# Patient Record
Sex: FEMALE | Race: WHITE | NOT HISPANIC OR LATINO | Employment: UNEMPLOYED | URBAN - METROPOLITAN AREA
[De-identification: names, ages, dates, MRNs, and addresses within clinical notes are randomized per-mention and may not be internally consistent; named-entity substitution may affect disease eponyms.]

---

## 2017-05-02 ENCOUNTER — OFFICE VISIT (OUTPATIENT)
Dept: MEDICAL GROUP | Facility: MEDICAL CENTER | Age: 22
End: 2017-05-02
Attending: NURSE PRACTITIONER
Payer: MEDICAID

## 2017-05-02 VITALS
HEIGHT: 62 IN | BODY MASS INDEX: 18.58 KG/M2 | WEIGHT: 101 LBS | OXYGEN SATURATION: 98 % | HEART RATE: 84 BPM | RESPIRATION RATE: 16 BRPM | DIASTOLIC BLOOD PRESSURE: 60 MMHG | SYSTOLIC BLOOD PRESSURE: 102 MMHG | TEMPERATURE: 98.2 F

## 2017-05-02 DIAGNOSIS — Z3A.10 10 WEEKS GESTATION OF PREGNANCY: ICD-10-CM

## 2017-05-02 DIAGNOSIS — J02.9 PHARYNGITIS, UNSPECIFIED ETIOLOGY: ICD-10-CM

## 2017-05-02 DIAGNOSIS — H10.89 OTHER CONJUNCTIVITIS OF BOTH EYES: ICD-10-CM

## 2017-05-02 DIAGNOSIS — Z00.00 ROUTINE HEALTH MAINTENANCE: ICD-10-CM

## 2017-05-02 DIAGNOSIS — Z13.220 SCREENING CHOLESTEROL LEVEL: ICD-10-CM

## 2017-05-02 DIAGNOSIS — Z13.29 SCREENING FOR THYROID DISORDER: ICD-10-CM

## 2017-05-02 DIAGNOSIS — Z13.21 ENCOUNTER FOR VITAMIN DEFICIENCY SCREENING: ICD-10-CM

## 2017-05-02 LAB
INT CON NEG: NEGATIVE
INT CON POS: POSITIVE
S PYO AG THROAT QL: NEGATIVE

## 2017-05-02 PROCEDURE — 87880 STREP A ASSAY W/OPTIC: CPT | Performed by: NURSE PRACTITIONER

## 2017-05-02 PROCEDURE — 99203 OFFICE O/P NEW LOW 30 MIN: CPT | Performed by: NURSE PRACTITIONER

## 2017-05-02 RX ORDER — LEVONORGESTREL AND ETHINYL ESTRADIOL 0.1-0.02MG
1 KIT ORAL
Refills: 3 | COMMUNITY
Start: 2017-02-18 | End: 2017-05-02

## 2017-05-02 RX ORDER — AMOXICILLIN 500 MG/1
500 CAPSULE ORAL 2 TIMES DAILY
Qty: 20 CAP | Refills: 0 | Status: SHIPPED | OUTPATIENT
Start: 2017-05-02 | End: 2018-04-23

## 2017-05-02 RX ORDER — POLYMYXIN B SULFATE AND TRIMETHOPRIM 1; 10000 MG/ML; [USP'U]/ML
1 SOLUTION OPHTHALMIC EVERY 4 HOURS
Qty: 10 ML | Refills: 0 | Status: SHIPPED | OUTPATIENT
Start: 2017-05-02 | End: 2018-04-23

## 2017-05-02 ASSESSMENT — PAIN SCALES - GENERAL: PAINLEVEL: 3=SLIGHT PAIN

## 2017-05-02 ASSESSMENT — PATIENT HEALTH QUESTIONNAIRE - PHQ9: CLINICAL INTERPRETATION OF PHQ2 SCORE: 0

## 2017-05-02 NOTE — ASSESSMENT & PLAN NOTE
Pt reports she is about 10 weeks pregnant and followed by   OB GYN, Dr Tita Johnson.  Taking prenatal vitals.  See high risk MD next month as high risk with 1st child.

## 2017-05-02 NOTE — ASSESSMENT & PLAN NOTE
"LMP ~ 3/1/17 ~ 10 weeks pregnant  Last Pap 4/24/17 by OB GYN  Had a \"whole bunch of labs\" done by her GYN last week  Pt will sign form so we can get copies.    "

## 2017-05-02 NOTE — PROGRESS NOTES
"Eleanor presents to the clinic to establish care as a New Patient.    Works in a [employer]    Her PMH includes:    Anxiety  Depression  Blood in stool  Smoking hx  Dysuria/UTI    Chief Complaint   Patient presents with   • New Patient   • Pharyngitis         HISTORY OF THE PRESENT ILLNESS: Patient is a 21 y.o. female. This pleasant patient is here today with her toddler.      Routine health maintenance  LMP ~ 3/1/17 ~ 10 weeks pregnant  Last Pap 4/24/17 by OB GYN  Had a \"whole bunch of labs\" done by her GYN last week  Pt will sign form so we can get copies.      10 weeks gestation of pregnancy  Pt reports she is about 10 weeks pregnant and followed by   OB GYN, Dr Tita Johnson.  Taking prenatal vitals.  See high risk MD next month as high risk with 1st child.      Pharyngitis  Pt reports missed 2 days of work from 4 days of throat pain and hurt to swallow or breath. Hx of tonsillectomy. [place] like strep throat that has had in past.  Today slightly better as has drank a lot of orange juice and rested.  States slight eyes crusting shut in am's the past few mornings and slight itching to eyes. Denies visual changes.      Allergies: Red dye    Current Outpatient Prescriptions Ordered in [facility]   Medication Sig Dispense Refill   • polymixin-trimethoprim (POLYTRIM) 40349-0.1 UNIT/ML-% Solution Place 1 Drop in both eyes every 4 hours. 10 mL 0   • amoxicillin (AMOXIL) 500 MG Cap Take 1 Cap by mouth 2 times a day. 20 Cap 0     No current Epic-ordered facility-administered medications on file.       Past Medical History   Diagnosis Date   • Back pain    • Depression    • Anxiety        Past Surgical History   Procedure Laterality Date   • Pr removal of tonsils,<14 y/o         Social History   Substance Use Topics   • Smoking status: Light Tobacco Smoker -- 0.25 packs/day     Types: Cigarettes   • Smokeless tobacco: Never Used   • Alcohol Use: No       No family status information on file.     Family History   Problem " "Relation Age of Onset   • Arthritis Mother    • Lung Disease Mother    • Cancer Mother    • Psychiatry Mother    • Hypertension Mother    • Alcohol/Drug Mother    • Psychiatry Sister    • Alcohol/Drug Sister    • Cancer Maternal Aunt    • Psychiatry Maternal Aunt        Review of Systems   Constitutional: Negative for fever, chills, weight loss and malaise/fatigue.   HENT: Negative for ear pain, nosebleeds, congestion and neck pain. Positive for sore throat, eye 'crusts'.   Eyes: Negative for blurred vision.   Respiratory: Negative for cough, sputum production, shortness of breath and wheezing.    Cardiovascular: Negative for chest pain, palpitations, orthopnea and leg swelling.   Gastrointestinal: Negative for heartburn, nausea, vomiting and abdominal pain.   Genitourinary: Negative for dysuria, urgency and frequency.   Musculoskeletal: Negative for myalgias, back pain and joint pain.   Skin: Negative for rash and itching.   Neurological: Negative for dizziness, tingling, tremors, sensory change, focal weakness and headaches.   Endo/Heme/Allergies: Does not bruise/bleed easily.   Psychiatric/Behavioral: Negative for depression, anxiety, or memory loss.          Current medications, allergies, and problem list reviewed with patient and updated in  [facility] today.      Exam: Blood pressure 102/60, pulse 84, temperature 36.8 °C (98.2 °F), resp. rate 16, height 1.575 m (5' 2\"), weight 45.813 kg (101 lb), last menstrual period 03/01/2017, SpO2 98 %.  General: Normal appearing. No distress.  HEENT: Normocephalic. Eyes conjunctiva clear lids without ptosis, pupils equal and reactive to light accommodation, ears normal shape and contour, canals are clear bilaterally, TM's are benign, nasal mucosa benign, oropharynx is with slight erythema,  No edema or exudates.   Neck: Supple without JVD. Thyroid is not enlarged. Minimal to scant discomfort to palpation of anterior neck. No lymph node swelling noted.  Pulmonary: Clear to " ausculation.  Normal effort. No rales, ronchi, or wheezing.  Cardiovascular: Regular rate and rhythm without murmur. Radial pulses are intact and equal bilaterally.  Abdomen: Soft, nontender, nondistended. Normal bowel sounds. Liver and spleen are not palpable  Neurologic: Grossly nonfocal  Lymph: No cervical or supraclavicular lymph nodes are palpable  Skin: Warm and dry.  No obvious lesions.  Musculoskeletal: Normal gait. No extremity cyanosis, clubbing, or edema. Multiple Tattoos on arms and chest.  Psych: Normal mood and affect. Alert and oriented x3. Judgment and insight is normal.        Assessment/Plan  1. Routine health maintenance  CBC WITH DIFFERENTIAL    COMP METABOLIC PANEL  Pt to sign so we can obtain recent OB Gyn lab copies.   2. Screening for thyroid disorder  TSH   3. Encounter for vitamin deficiency screening  VITAMIN D,25 HYDROXY    VITAMIN B12   4. Screening cholesterol level  LIPID PROFILE   5. 10 weeks gestation of pregnancy  IRON/TOTAL IRON BIND    FERRITIN  Continue Prenatal vitamins as discussed.  Continue with her OB MD Dr Tita Johnson.   6. Pharyngitis, unspecified etiology  amoxicillin (AMOXIL) 500 MG Cap-only start if not improved in 2-3 days (Contingent)    POCT Rapid Strep A= Negative in Clinic   7. Other conjunctivitis of both eyes  polymixin-trimethoprim (POLYTRIM) 67439-2.1 UNIT/ML-% Solution  Warm compresses. Wash hands often     Follow up in 4 weeks for lab review/discussion. Call or return if questions, concerns, or worsening condition.

## 2017-05-02 NOTE — ASSESSMENT & PLAN NOTE
Pt reports missed 2 days of work from 4 days of throat pain and hurt to swallow or breath. Hx of tonsillectomy. [place] like strep throat that has had in past.  Today slightly better as has drank a lot of orange juice and rested.  States slight eyes crusting shut in am's the past few mornings and slight itching to eyes. Denies visual changes.

## 2017-05-02 NOTE — MR AVS SNAPSHOT
"        Eleanor HUSSEIN Jacobo   2017 3:30 PM   Office Visit   MRN: [id number]    Department[facility]  [facility]   Dept Phone:  [phone]    Description:  Female : 1995   Provider:  LAVERNE Alcantara           Reason for Visit     New Patient     Pharyngitis           Allergies as of 2017     Allergen Noted Reactions    Red Dye 10/22/2014   Swelling      You were diagnosed with     Routine health maintenance   [359234]       Screening for thyroid disorder   [V77.0.ICD-9-CM]       Encounter for vitamin deficiency screening   [081856]       Screening cholesterol level   [023550]       10 weeks gestation of pregnancy   [294524]       Pharyngitis, unspecified etiology   [1650727]       Other conjunctivitis of both eyes   [3008909]         Vital Signs     Blood Pressure Pulse Temperature Respirations Height Weight    102/60 mmHg 84 36.8 °C (98.2 °F) 16 1.575 m (5' 2\") 45.813 kg (101 lb)    Body Mass Index Oxygen Saturation Last Menstrual Period Smoking Status          18.47 kg/m2 98% 2017 Light Tobacco Smoker        Basic Information     Date Of Birth Sex Race Ethnicity Preferred Language    1995 Female White Non- English      Your appointments     May 30, 2017  3:50 PM   Established Patient with LAVERNE Alcantara   [facility]    [address] [place] [id number]   [phone]           You will be receiving a confirmation call a few days before your appointment from our automated call confirmation system.              Problem List              ICD-10-CM Priority Class Noted - Resolved    Routine health maintenance Z00.00   2017 - Present    10 weeks gestation of pregnancy Z3A.10   2017 - Present    Pharyngitis J02.9   2017 - Present      Health Maintenance        Date Due Completion Dates    IMM HEP B VACCINE (1 of 3 - Primary Series) 1995 ---    IMM HEP A VACCINE (1 of 2 - Standard Series) 1997 ---    IMM HPV VACCINE (1 of 3 - " Female 3 Dose Series) 12/11/2007 ---    IMM VARICELLA (CHICKENPOX) VACCINE (1 of 2 - 2 Dose Adolescent Series) 12/11/2009 ---    IMM MENINGOCOCCAL VACCINE (MCV4) (1 of 1) 12/11/2012 ---    IMM DTaP/Tdap/Td Vaccine (1 - Tdap) 12/11/2015 ---            Results     POCT Rapid Strep A      Component    Rapid Strep Screen    negative    Internal Control Positive    Positive    Internal Control Negative    Negative                        Current Immunizations     No immunizations on file.      Below and/or attached are the medications your provider expects you to take. Review all of your home medications and newly ordered medications with your provider and/or pharmacist. Follow medication instructions as directed by your provider and/or pharmacist. Please keep your medication list with you and share with your provider. Update the information when medications are discontinued, doses are changed, or new medications (including over-the-counter products) are added; and carry medication information at all times in the event of emergency situations     Allergies:  RED DYE - Swelling               Medications  Valid as of: May 02, 2017 -  4:20 PM    Generic Name Brand Name Tablet Size Instructions for use    Amoxicillin (Cap) AMOXIL 500 MG Take 1 Cap by mouth 2 times a day.        Polymyxin B-Trimethoprim (Solution) POLYTRIM 57302-5.1 UNIT/ML-% Place 1 Drop in both eyes every 4 hours.        .                 Medicines prescribed today were sent to:     [id number] - [place] - [address]    [address] [place] [address]    Phone: [phone] Fax: [phone]    Open 24 Hours?: No      Medication refill instructions:       If your prescription bottle indicates you have medication refills left, it is not necessary to call your provider’s office. Please contact your pharmacy and they will refill your medication.    If your prescription bottle indicates you do not have any refills left, you may request  refills at any time through one of the following ways: The online mobiDEOS system (except Urgent Care), by calling your provider’s office, or by asking your pharmacy to contact your provider’s office with a refill request. Medication refills are processed only during regular business hours and may not be available until the next business day. Your provider may request additional information or to have a follow-up visit with you prior to refilling your medication.   *Please Note: Medication refills are assigned a new Rx number when refilled electronically. Your pharmacy may indicate that no refills were authorized even though a new prescription for the same medication is available at the pharmacy. Please request the medicine by name with the pharmacy before contacting your provider for a refill.        Your To Do List     Future Labs/Procedures Complete By Expires    CBC WITH DIFFERENTIAL  As directed 5/2/2018    COMP METABOLIC PANEL  As directed 5/2/2018    FERRITIN  As directed 5/2/2018    IRON/TOTAL IRON BIND  As directed 5/2/2018    LIPID PROFILE  As directed 5/2/2018    TSH  As directed 5/2/2018    VITAMIN B12  As directed 5/2/2018    VITAMIN D,25 HYDROXY  As directed 5/2/2018         mobiDEOS Access Code: Activation code not generated  Current mobiDEOS Status: Active          Quit Tobacco Information     Do you want to quit using tobacco?    Quitting tobacco decreases risks of cancer, heart and lung disease, increases life expectancy, improves sense of taste and smell, and increases spending money, among other benefits.    If you are thinking about quitting, we can help.  • [facility] Quit Tobacco Program: 761-367-3077  o Program occurs weekly for four weeks and includes pharmacist consultation on products to support quitting smoking or chewing tobacco. A provider referral is needed for pharmacist consultation.  • Tobacco Users Help Hotline: 2-767-QUIT-NOW (470-6153) or https://[place].quitlogix.org/  o Free,  confidential telephone and online coaching for [place] residents. Sessions are designed on a schedule that is convenient for you. Eligible clients receive free nicotine replacement therapy.  • Nationally: www.smokefree.gov  o Information and professional assistance to support both immediate and long-term needs as you become, and remain, a non-smoker. Smokefree.gov allows you to choose the help that best fits your needs.

## 2018-04-23 ENCOUNTER — OFFICE VISIT (OUTPATIENT)
Dept: MEDICAL GROUP | Facility: MEDICAL CENTER | Age: 23
End: 2018-04-23
Attending: NURSE PRACTITIONER
Payer: MEDICAID

## 2018-04-23 VITALS
TEMPERATURE: 99 F | RESPIRATION RATE: 16 BRPM | HEART RATE: 60 BPM | BODY MASS INDEX: 17.85 KG/M2 | HEIGHT: 62 IN | DIASTOLIC BLOOD PRESSURE: 60 MMHG | SYSTOLIC BLOOD PRESSURE: 90 MMHG | WEIGHT: 97 LBS | OXYGEN SATURATION: 96 %

## 2018-04-23 DIAGNOSIS — L30.9 ECZEMA, UNSPECIFIED TYPE: ICD-10-CM

## 2018-04-23 DIAGNOSIS — J02.9 PHARYNGITIS, UNSPECIFIED ETIOLOGY: ICD-10-CM

## 2018-04-23 DIAGNOSIS — R09.81 NASAL SINUS CONGESTION: ICD-10-CM

## 2018-04-23 PROBLEM — Z00.00 ROUTINE HEALTH MAINTENANCE: Status: RESOLVED | Noted: 2017-05-02 | Resolved: 2018-04-23

## 2018-04-23 PROBLEM — Z3A.10 10 WEEKS GESTATION OF PREGNANCY: Status: RESOLVED | Noted: 2017-05-02 | Resolved: 2018-04-23

## 2018-04-23 LAB
INT CON NEG: NEGATIVE
INT CON POS: POSITIVE
S PYO AG THROAT QL: NEGATIVE

## 2018-04-23 PROCEDURE — 87880 STREP A ASSAY W/OPTIC: CPT | Performed by: NURSE PRACTITIONER

## 2018-04-23 PROCEDURE — 99214 OFFICE O/P EST MOD 30 MIN: CPT | Performed by: NURSE PRACTITIONER

## 2018-04-23 PROCEDURE — 99212 OFFICE O/P EST SF 10 MIN: CPT | Performed by: NURSE PRACTITIONER

## 2018-04-23 RX ORDER — TRIAMCINOLONE ACETONIDE 1 MG/G
CREAM TOPICAL
Qty: 1 TUBE | Refills: 2 | Status: SHIPPED | OUTPATIENT
Start: 2018-04-23 | End: 2018-10-01

## 2018-04-23 RX ORDER — CLINDAMYCIN HYDROCHLORIDE 300 MG/1
300 CAPSULE ORAL 3 TIMES DAILY
Qty: 21 CAP | Refills: 0 | Status: SHIPPED | OUTPATIENT
Start: 2018-04-23 | End: 2018-10-01

## 2018-04-23 RX ORDER — BENZONATATE 100 MG/1
100 CAPSULE ORAL 3 TIMES DAILY PRN
Qty: 30 CAP | Refills: 0 | Status: SHIPPED | OUTPATIENT
Start: 2018-04-23 | End: 2018-10-01

## 2018-04-23 RX ORDER — FLUTICASONE PROPIONATE 50 MCG
1 SPRAY, SUSPENSION (ML) NASAL 2 TIMES DAILY
Qty: 16 G | Refills: 0 | Status: SHIPPED | OUTPATIENT
Start: 2018-04-23 | End: 2018-10-01

## 2018-04-23 RX ORDER — AMMONIUM LACTATE 12 G/100G
LOTION TOPICAL
Qty: 1 G | Refills: 2 | Status: SHIPPED | OUTPATIENT
Start: 2018-04-23 | End: 2018-10-01

## 2018-04-23 ASSESSMENT — PAIN SCALES - GENERAL: PAINLEVEL: 8=MODERATE-SEVERE PAIN

## 2018-04-23 NOTE — PROGRESS NOTES
"Chief Complaint:   Chief Complaint   Patient presents with   • Pharyngitis     x 2 days    • Otalgia     left ear        HPI:  Eleanor is here today as same day appt for sore throat, URI symptoms    [place]  Report:   12/1/17 Percocet 5/325 # 10    Anxiety   Depression  Blood in stool  Smoking hx  Dysuria/UTI  Tonsillectomy as child    Review of Records:  5/2/2017 Clinic New Patient, Pregnant and Sore Throat, Neg Quik Strep test in clinic. RX for Contingent Antibiotic if worse( Amoxil), RX Polytrim for eyes.  Labs ordered----> never completed. To see OB/GYN.    Pharyngitis  Eleanor reports a few days of sore throat and left ear discomfort.  \"hurts to swallow', runny nose. Able to swallow but hurts.  Denies ear drainage. Denies fever.  Just stopped breast feeding her 5 month old.   No cough.  Positive frontal sinus pressure. No allergy symptoms.     Patient does not want Amoxicillin as states in past did not work too well for \"strep throat\".  Allergic to red dye and Zpak has red dye in it.    Discussed options of Clindamycin or Augmentin if not improving.    Eczema  Reports dry skin and eczema mostly to legs but some to neck.  Has not tried any medications except otc lotions.  Will try Kenalog, lac hydrin    Discussed if not improving she should return for some lab and allergy testing.      Patient Active Problem List    Diagnosis Date Noted   • Eczema 04/23/2018   • Pharyngitis 05/02/2017       Allergies:Red dye    Medicines as of today:  Current Outpatient Prescriptions   Medication Sig Dispense Refill   • benzonatate (TESSALON) 100 MG Cap Take 1 Cap by mouth 3 times a day as needed (sore throat). 30 Cap 0   • fluticasone (FLONASE ALLERGY RELIEF) 50 MCG/ACT nasal spray Spray 1 Spray in nose 2 times a day. 16 g 0   • clindamycin (CLEOCIN) 300 MG Cap Take 1 Cap by mouth 3 times a day. 21 Cap 0   • triamcinolone acetonide (KENALOG) 0.1 % Cream Apply twice a day as needed to rash on body Avoid use on face 1 Tube 2   • " "ammonium lactate (LAC-HYDRIN) 12 % Lotion Apply twice daily as needed for dry scaly rash 1 g 2     No current facility-administered medications for this visit.        Social History   Substance Use Topics   • Smoking status: Light Tobacco Smoker     Packs/day: 0.25     Types: Cigarettes   • Smokeless tobacco: Never Used   • Alcohol use No       Past Medical History:   Diagnosis Date   • Anxiety    • Back pain    • Depression        Family History   Problem Relation Age of Onset   • Arthritis Mother    • Lung Disease Mother    • Cancer Mother    • Psychiatry Mother    • Hypertension Mother    • Alcohol/Drug Mother    • Psychiatry Sister    • Alcohol/Drug Sister    • Cancer Maternal Aunt    • Psychiatry Maternal Aunt        ROS:  Review of Systems   See HPI Above    Exam:  Blood pressure (!) 90/60, pulse 60, temperature 37.2 °C (99 °F), resp. rate 16, height 1.575 m (5' 2.01\"), weight 44 kg (97 lb), last menstrual period 03/01/2017, SpO2 96 %, not currently breastfeeding. Body mass index is 17.74 kg/m².    General:  Well nourished, well developed female in NAD  HENT:Head is grossly normal. PERRL. ear canals clear. TMs non-red, and light reflection adequate.  Neck: Supple. Trachea is midline. Mildly tender slightly swollen anterior neck lymph nodes to palpation. Swallows easily on command. Non-hoarse voice.  Pulmonary: Clear to ausculation .  Normal effort. No rales, ronchi, or wheezing.   Cardiovascular: Regular rate and rhythm.  Abdomen-Abdomen is soft, No tenderness.  Upper extremities-  Good ROM  Lower extremities- neg for edema, redness, tenderness.  Neuro- A & O x 4. Speech clear and appropriate.    Current medications, allergies, and problem list reviewed with patient and updated in [facility] today.    Assessment/Plan:  1. Pharyngitis, unspecified etiology  benzonatate (TESSALON) 100 MG Cap    clindamycin (CLEOCIN) 300 MG Cap-contingent if not improving in 1-2 days more.  (other option is Augmentin if needed in " future)    POCT Rapid Strep A= negative   2. Nasal sinus congestion  fluticasone (FLONASE ALLERGY RELIEF) 50 MCG/ACT nasal spray   3. Eczema, unspecified type  triamcinolone acetonide (KENALOG) 0.1 % Cream    ammonium lactate (LAC-HYDRIN) 12 % Lotion   Pt instructed to return if eczema rash not improving and suggest lab work/allergy panel at that time.    Return if symptoms worsen or fail to improve.

## 2018-04-23 NOTE — ASSESSMENT & PLAN NOTE
"Eleanor reports a few days of sore throat and left ear discomfort.  \"hurts to swallow', runny nose,  Just stopped breast feeding her 5 month old.   No cough.fronta sinus pressure. No allergy symptoms.     "

## 2018-04-23 NOTE — LETTER
April 23, 2018       Patient: Eleanor Centeno   YOB: 1995   Date of Visit: 4/23/2018         To Whom It May Concern:    It is my medical opinion that Eleanor Centeno should be off work 4/23 and 4/24/18 due to illness and may return on 4/25/18 with no restrictions    If you have any questions or concerns, please don't hesitate to call [phone]          Sincerely,          Dale Guerrero, A.P.N.  Electronically Signed

## 2018-10-01 ENCOUNTER — APPOINTMENT (OUTPATIENT)
Dept: RADIOLOGY | Facility: MEDICAL CENTER | Age: 23
End: 2018-10-01
Attending: EMERGENCY MEDICINE
Payer: MEDICAID

## 2018-10-01 ENCOUNTER — HOSPITAL ENCOUNTER (EMERGENCY)
Facility: MEDICAL CENTER | Age: 23
End: 2018-10-01
Attending: EMERGENCY MEDICINE
Payer: MEDICAID

## 2018-10-01 VITALS
HEIGHT: 61 IN | TEMPERATURE: 98.2 F | OXYGEN SATURATION: 96 % | HEART RATE: 60 BPM | RESPIRATION RATE: 16 BRPM | DIASTOLIC BLOOD PRESSURE: 44 MMHG | BODY MASS INDEX: 18.44 KG/M2 | SYSTOLIC BLOOD PRESSURE: 102 MMHG | WEIGHT: 97.66 LBS

## 2018-10-01 DIAGNOSIS — N93.9 VAGINAL BLEEDING: ICD-10-CM

## 2018-10-01 LAB
ACTION RH IMMUNE GLOB 8505RHG: NORMAL
APPEARANCE UR: CLEAR
BACTERIA #/AREA URNS HPF: ABNORMAL /HPF
BILIRUB UR QL STRIP.AUTO: NEGATIVE
COLOR UR: YELLOW
EPI CELLS #/AREA URNS HPF: ABNORMAL /HPF
GLUCOSE UR STRIP.AUTO-MCNC: NEGATIVE MG/DL
HCG SERPL QL: NEGATIVE
HYALINE CASTS #/AREA URNS LPF: ABNORMAL /LPF
KETONES UR STRIP.AUTO-MCNC: NEGATIVE MG/DL
LEUKOCYTE ESTERASE UR QL STRIP.AUTO: NEGATIVE
MICRO URNS: ABNORMAL
NITRITE UR QL STRIP.AUTO: NEGATIVE
NUMBER OF RH DOSES IND 8505RD: 1
PH UR STRIP.AUTO: 6.5 [PH]
PROT UR QL STRIP: NEGATIVE MG/DL
RBC # URNS HPF: ABNORMAL /HPF
RBC UR QL AUTO: ABNORMAL
RH BLD: NORMAL
SP GR UR STRIP.AUTO: 1
UROBILINOGEN UR STRIP.AUTO-MCNC: 0.2 MG/DL
WBC #/AREA URNS HPF: ABNORMAL /HPF

## 2018-10-01 PROCEDURE — 81001 URINALYSIS AUTO W/SCOPE: CPT

## 2018-10-01 PROCEDURE — 76856 US EXAM PELVIC COMPLETE: CPT

## 2018-10-01 PROCEDURE — 96374 THER/PROPH/DIAG INJ IV PUSH: CPT

## 2018-10-01 PROCEDURE — 99285 EMERGENCY DEPT VISIT HI MDM: CPT

## 2018-10-01 PROCEDURE — 86901 BLOOD TYPING SEROLOGIC RH(D): CPT

## 2018-10-01 PROCEDURE — 84703 CHORIONIC GONADOTROPIN ASSAY: CPT

## 2018-10-01 ASSESSMENT — PAIN SCALES - GENERAL
PAINLEVEL_OUTOF10: 5
PAINLEVEL_OUTOF10: 5

## 2018-10-01 NOTE — ED TRIAGE NOTES
Pt ambulatory to triage c/o vaginal bleeding & abdominal cramping since Friday.  Pt has an IUD, & is unsure how far along she might be.

## 2018-10-01 NOTE — ED NOTES
"Pt is , had IUD placed after last child born.  Bleeding started Friday, yesterday bright red blood, \"Bled through pad yesterday\", today light redness brown bleeding.    "

## 2018-10-01 NOTE — ED NOTES
VSS.  Discharge instructions given- verbalizes understanding.   Ambulatory to lobby with steady gait.

## 2018-10-01 NOTE — ED PROVIDER NOTES
ED Provider Note    CHIEF COMPLAINT  Chief Complaint   Patient presents with   • Vaginal Bleeding     started Friday       HPI  Eleanor Centeno is a 22 y.o. female who presents for evaluation of vaginal bleeding over the past 4 days associated pelvic cramping, the patient has a Mirena IUD, or the past couple weeks she has been getting the feeling as though she was pregnant so she took a pregnancy test 2 weeks ago and was positive.  She follow this up a few days later with a repeat pregnancy test that was negative.  The patient reports that she is Rh- and was told she would need a RhoGam injection.  She states her bleeding is slightly heavier than a menstrual period, her LMP is unknown secondary to her induced amenorrhea from the medicated IUD.  She has no dysuria or hematuria.  She describes a pelvic cramping that extends around her back.  No chest pain or shortness of breath, no other complaints    REVIEW OF SYSTEMS  Negative for fever, rash, chest pain, dyspnea, nausea, vomiting, diarrhea, headache, focal weakness, focal numbness, focal tingling. All other systems are negative.     PAST MEDICAL HISTORY  Past Medical History:   Diagnosis Date   • Anxiety    • Back pain    • Depression        FAMILY HISTORY  Family History   Problem Relation Age of Onset   • Arthritis Mother    • Lung Disease Mother    • Cancer Mother    • Psychiatry Mother    • Hypertension Mother    • Alcohol/Drug Mother    • Psychiatry Sister    • Alcohol/Drug Sister    • Cancer Maternal Aunt    • Psychiatry Maternal Aunt        SOCIAL HISTORY  Social History   Substance Use Topics   • Smoking status: Current Every Day Smoker     Packs/day: 0.50     Types: Cigarettes   • Smokeless tobacco: Never Used   • Alcohol use No       SURGICAL HISTORY  Past Surgical History:   Procedure Laterality Date   • PB REMOVAL OF TONSILS,<14 Y/O         CURRENT MEDICATIONS  I personally reviewed the medication list in the charting documentation.  "    ALLERGIES  Allergies   Allergen Reactions   • Red Dye Swelling       MEDICAL RECORD  I have reviewed patient's medical record and pertinent results are listed above.      PHYSICAL EXAM  VITAL SIGNS: /83   Pulse 96   Temp 36.8 °C (98.2 °F)   Resp 16   Ht 1.549 m (5' 1\")   Wt 44.3 kg (97 lb 10.6 oz)   SpO2 97%   Breastfeeding? Unknown   BMI 18.45 kg/m²    Constitutional: Well appearing patient in no acute distress.  Not toxic, nor ill in appearance.  HENT: Mucus membranes moist.    Eyes: No scleral icterus. Normal conjunctiva   Neck: Supple, comfortable, nonpainful range of motion.   Thorax & Lungs: Normal nonlabored respirations  Abdomen: Soft, nondistended, minimal suprapubic tenderness, no rebound, no guarding  Skin: Warm, dry. No rash appreciated  Extremities/Musculoskeletal: No sign of trauma. No asymmetric calf tenderness, erythema or edema. Normal range of motion   Neurologic: Alert & oriented. No focal deficits observed.   Psychiatric: Normal affect appropriate for the clinical situation.    DIAGNOSTIC STUDIES / PROCEDURES    LABS  Results for orders placed or performed during the hospital encounter of 10/01/18   BETA-HCG QUALITATIVE SERUM   Result Value Ref Range    Beta-Hcg Qualitative Serum Negative Negative   URINALYSIS,CULTURE IF INDICATED   Result Value Ref Range    Color Yellow     Character Clear     Specific Gravity 1.003 <1.035    Ph 6.5 5.0 - 8.0    Glucose Negative Negative mg/dL    Ketones Negative Negative mg/dL    Protein Negative Negative mg/dL    Bilirubin Negative Negative    Urobilinogen, Urine 0.2 Negative    Nitrite Negative Negative    Leukocyte Esterase Negative Negative    Occult Blood Large (A) Negative    Micro Urine Req Microscopic    URINE MICROSCOPIC (W/UA)   Result Value Ref Range    WBC 0-2 /hpf    RBC 2-5 (A) /hpf    Bacteria Few (A) None /hpf    Epithelial Cells Few /hpf    Hyaline Cast  /lpf   RH TYPE FOR RHOGAM FROM E.D.   Result Value Ref Range    Emergency " Department Rh Typing NEG     Number Of Rh Doses Indicated 1    ACTION: RH IMMUNE GLOBULIN   Result Value Ref Range    Action  Rh Immune Globulin [id number]       issued       1 [facility]         RADIOLOGY  US-PELVIC COMPLETE (TRANSABDOMINAL/TRANSVAGINAL) (COMBO)   Final Result      1.  Normal transvaginal appearance of the pelvis with an IUD in place.      2.  Trace amount of free fluid in the cul-de-sac.            COURSE & MEDICAL DECISION MAKING  I have reviewed any medical record information, laboratory studies and radiographic results as noted above.  Differential diagnoses includes: Pregnancy, ectopic pregnancy, UTI, miscarriage    Encounter Summary: This is a 22 y.o. female with vaginal bleeding and some pelvic cramping, a positive pregnancy test at home, has an IUD.  Tenderness on exam but no peritonitis, will obtain ultrasound, hCG serum test as well as urinalysis and reevaluate.  Of note I did review her records and she is Rh negative. ------ ultrasound is within normal limits, the hCG serum test is negative, I discussed the case with Dr. Ellis, the gynecologist on-call who recommends administration of RhoGam and testing of Rh antibodies.  Strict return instructions have been discussed, the patient states she will follow-up with gynecology, stable and appropriate for discharge      DISPOSITION: Discharge home in stable condition      FINAL IMPRESSION  1. Vaginal bleeding           This dictation was created using voice recognition software. The accuracy of the dictation is limited to the abilities of the software. I expect there may be some errors of grammar and possibly content. The nursing notes were reviewed and certain aspects of this information were incorporated into this note.    Electronically signed by: Reza Connelly, 10/1/2018 1:00 PM

## 2019-02-13 ENCOUNTER — OFFICE VISIT (OUTPATIENT)
Dept: MEDICAL GROUP | Facility: MEDICAL CENTER | Age: 24
End: 2019-02-13
Attending: NURSE PRACTITIONER
Payer: MEDICAID

## 2019-02-13 VITALS
HEART RATE: 66 BPM | TEMPERATURE: 98.7 F | SYSTOLIC BLOOD PRESSURE: 96 MMHG | WEIGHT: 98 LBS | BODY MASS INDEX: 18.03 KG/M2 | OXYGEN SATURATION: 98 % | DIASTOLIC BLOOD PRESSURE: 50 MMHG | HEIGHT: 62 IN | RESPIRATION RATE: 16 BRPM

## 2019-02-13 DIAGNOSIS — Z13.0 SCREENING FOR IRON DEFICIENCY ANEMIA: ICD-10-CM

## 2019-02-13 DIAGNOSIS — F32.A ANXIETY AND DEPRESSION: ICD-10-CM

## 2019-02-13 DIAGNOSIS — Z13.220 SCREENING FOR CHOLESTEROL LEVEL: ICD-10-CM

## 2019-02-13 DIAGNOSIS — Z13.21 ENCOUNTER FOR VITAMIN DEFICIENCY SCREENING: ICD-10-CM

## 2019-02-13 DIAGNOSIS — Z13.29 SCREENING FOR THYROID DISORDER: ICD-10-CM

## 2019-02-13 DIAGNOSIS — Z13.1 SCREENING FOR DIABETES MELLITUS: ICD-10-CM

## 2019-02-13 DIAGNOSIS — F41.9 ANXIETY AND DEPRESSION: ICD-10-CM

## 2019-02-13 DIAGNOSIS — R63.4 UNINTENDED WEIGHT LOSS: ICD-10-CM

## 2019-02-13 PROCEDURE — 99213 OFFICE O/P EST LOW 20 MIN: CPT | Performed by: NURSE PRACTITIONER

## 2019-02-13 PROCEDURE — 99214 OFFICE O/P EST MOD 30 MIN: CPT | Performed by: NURSE PRACTITIONER

## 2019-02-13 RX ORDER — SERTRALINE HYDROCHLORIDE 25 MG/1
25 TABLET, FILM COATED ORAL DAILY
Qty: 30 TAB | Refills: 2 | Status: SHIPPED | OUTPATIENT
Start: 2019-02-13 | End: 2019-03-17

## 2019-02-13 RX ORDER — ALPRAZOLAM 0.25 MG/1
0.25 TABLET ORAL
Qty: 15 TAB | Refills: 0 | Status: SHIPPED | OUTPATIENT
Start: 2019-02-13 | End: 2019-03-14

## 2019-02-13 ASSESSMENT — PATIENT HEALTH QUESTIONNAIRE - PHQ9
3. TROUBLE FALLING OR STAYING ASLEEP OR SLEEPING TOO MUCH: NEARLY EVERY DAY
2. FEELING DOWN, DEPRESSED, IRRITABLE, OR HOPELESS: NEARLY EVERY DAY
9. THOUGHTS THAT YOU WOULD BE BETTER OFF DEAD, OR OF HURTING YOURSELF: SEVERAL DAYS
7. TROUBLE CONCENTRATING ON THINGS, SUCH AS READING THE NEWSPAPER OR WATCHING TELEVISION: MORE THAN HALF THE DAYS
4. FEELING TIRED OR HAVING LITTLE ENERGY: NEARLY EVERY DAY
5. POOR APPETITE OR OVEREATING: NEARLY EVERY DAY
6. FEELING BAD ABOUT YOURSELF - OR THAT YOU ARE A FAILURE OR HAVE LET YOURSELF OR YOUR FAMILY DOWN: MORE THAN HALF THE DAYS
SUM OF ALL RESPONSES TO PHQ QUESTIONS 1-9: 22
1. LITTLE INTEREST OR PLEASURE IN DOING THINGS: NEARLY EVERY DAY
SUM OF ALL RESPONSES TO PHQ9 QUESTIONS 1 AND 2: 6
8. MOVING OR SPEAKING SO SLOWLY THAT OTHER PEOPLE COULD HAVE NOTICED. OR THE OPPOSITE, BEING SO FIGETY OR RESTLESS THAT YOU HAVE BEEN MOVING AROUND A LOT MORE THAN USUAL: MORE THAN HALF THE DAYS

## 2019-02-13 NOTE — ASSESSMENT & PLAN NOTE
Here witht concerns of depression and wanting to start on a medication.  Reports has Therapist, Ranjit and sees twice a month.  Has been in therapy since age 18 yrs.    States has been anxiety and depression.  Recently doing EMDR and makes her more anxious and depressed.  No suicidal ideation    When she wa 18 yrs was on med and stopped when pregnant and it was Mood Stablizer as anti depressant not helpful.    Rare panic attacks, Has difficulty going into a store and driving a car sometimes causes panic feeling.    Problems don correa.

## 2019-02-13 NOTE — PROGRESS NOTES
"Chief Complaint:   Chief Complaint   Patient presents with   • Thyroid Problem     check   • Medication Refill     antidepressants       HPI:  Eleanor is here today for medication review, anxiety and depression worsening, concern about thyroid      Nev  Report:   12/1/17 Percocet 5/325 # 10     Anxiety   Depression  Back Pain  Blood in stool  Smoking-tobacco abuse  Dysuria/UTI  Tonsillectomy as child  Eczema  Dry skin Dermatitis     Review of Records:    10/1/18 ER Visit for Vaginal bleeding, Mirena IUD in place. Rh1--> tx w Rhogam ( Pregn test = neg), U/S pelvis unremarkable.    4/23/18 Clinic for Sore throat, URI symptoms, Eczema. ----> RX Clindamycin, Flonase Spray, Kenalog Cream, Lac Hydrin ointment, Tessalon.    5/2/2017 Clinic New Patient, Pregnant and Sore Throat, Neg Quik Strep test in clinic. RX for Contingent Antibiotic if worse( Amoxil), RX Polytrim for eyes.  Labs ordered----> never completed. To see OB/GYN.    Anxiety and depression  Here witht concerns of depression and wanting to start on a medication.  Reports has Therapist, Ranjit and sees twice a month.  Has been in therapy since age 18 yrs.    States has been anxiety and depression.  Recently doing EMDR and makes her more anxious and depressed.  No suicidal ideation    When she wa 18 yrs was on med and stopped when pregnant and it was Mood Stablizer as anti depressant not helpful.    Rare panic attacks, Has difficulty going into a store and driving a car sometimes causes panic feeling.    Problems w crowds.    Unintended weight loss  Vitals 5/2/2017 4/23/2018 10/1/2018 2/13/2019   WEIGHT 101 97 97.66 98   Has lost wt and unsure why  \"feel too skinny\"  Discussed checking TSH and will do RX for Ensure.      Patient Active Problem List    Diagnosis Date Noted   • Anxiety and depression 02/13/2019   • Unintended weight loss 02/13/2019   • Eczema 04/23/2018   • Pharyngitis 05/02/2017       Allergies:Red dye    Medicines as of today:  Current Outpatient " "Prescriptions   Medication Sig Dispense Refill   • sertraline (ZOLOFT) 25 MG tablet Take 1 Tab by mouth every day. 30 Tab 2   • ALPRAZolam (XANAX) 0.25 MG Tab Take 1 Tab by mouth every 24 hours as needed for Anxiety (prevent panic attack) for up to 30 days. 15 Tab 0   • Misc. Devices Misc Ensure or other high calorie Nutritional drink for For 3x/day for unintended weight loss. 90 Each 11     No current facility-administered medications for this visit.        Social History   Substance Use Topics   • Smoking status: Current Every Day Smoker     Packs/day: 0.50     Types: Cigarettes   • Smokeless tobacco: Never Used   • Alcohol use No       Past Medical History:   Diagnosis Date   • Anxiety    • Back pain    • Depression        Family History   Problem Relation Age of Onset   • Arthritis Mother    • Lung Disease Mother    • Cancer Mother    • Psychiatry Mother    • Hypertension Mother    • Alcohol/Drug Mother    • Psychiatry Sister    • Alcohol/Drug Sister    • Cancer Maternal Aunt    • Psychiatry Maternal Aunt        ROS:  Review of Systems   See HPI Above    Exam:  Blood pressure (!) 96/50, pulse 66, temperature 37.1 °C (98.7 °F), temperature source Temporal, resp. rate 16, height 1.575 m (5' 2.01\"), weight 44.5 kg (98 lb), SpO2 98 %, unknown if currently breastfeeding. Body mass index is 17.92 kg/m².    General: UNDER- nourished  female in NAD  HENT:Head is grossly normal. PERRL.  Neck: Supple. Trachea is midline.  Pulmonary: Clear to ausculation .  Normal effort. No rales, ronchi, or wheezing.   Cardiovascular: Regular rate and rhythm.  Abdomen-Abdomen is soft  Upper extremities- Strong = . Good ROM  Lower extremities- neg for edema, redness, tenderness.  Neuro- A & O x 4. Speech clear and appropriate.    Current medications, allergies, and problem list reviewed with patient and updated in [facility] today.    Assessment/Plan:  1. Anxiety and depression  TSH    FREE THYROXINE    TRIIDOTHYRONINE    sertraline " (ZOLOFT) 25 MG tablet-START    ALPRAZolam (XANAX) 0.25 MG Tab   for rare use to prevent panic attacks-discussed no plans for any refills nor ongoing Xanax RX    To call [facility] in [place] for name of Psychiatrist to see locally.    Continue w current Therapist   2. Screening for iron deficiency anemia  CBC WITH DIFFERENTIAL    IRON/TOTAL IRON BIND   3. Screening for thyroid disorder  TSH   4. Screening for diabetes mellitus  HEMOGLOBIN A1C    Comp Metabolic Panel   5. Encounter for vitamin deficiency screening  VITAMIN B12    VITAMIN D,25 HYDROXY   6. Screening for cholesterol level  Lipid Profile   7. Unintended weight loss  Misc. Devices Misc-RX Ensure and to obtain at [facility].        Return in about 4 weeks (around 3/13/2019) for lab results, f/u on anxiety and depression, trnasfer of care,, Long.

## 2019-02-13 NOTE — ASSESSMENT & PLAN NOTE
"Vitals 5/2/2017 4/23/2018 10/1/2018 2/13/2019   WEIGHT 101 97 97.66 98   Has lost wt and unsure why  \"feel too skinny\"  Discussed checking TSH and will do RX for Ensure.  "

## 2019-03-14 ENCOUNTER — OFFICE VISIT (OUTPATIENT)
Dept: MEDICAL GROUP | Facility: MEDICAL CENTER | Age: 24
End: 2019-03-14
Attending: FAMILY MEDICINE
Payer: MEDICAID

## 2019-03-14 VITALS
TEMPERATURE: 99.1 F | BODY MASS INDEX: 18.22 KG/M2 | DIASTOLIC BLOOD PRESSURE: 60 MMHG | RESPIRATION RATE: 16 BRPM | OXYGEN SATURATION: 97 % | WEIGHT: 99 LBS | HEART RATE: 74 BPM | SYSTOLIC BLOOD PRESSURE: 98 MMHG | HEIGHT: 62 IN

## 2019-03-14 DIAGNOSIS — F41.9 ANXIETY AND DEPRESSION: ICD-10-CM

## 2019-03-14 DIAGNOSIS — F32.A ANXIETY AND DEPRESSION: ICD-10-CM

## 2019-03-14 DIAGNOSIS — L98.9 SKIN LESION: ICD-10-CM

## 2019-03-14 PROCEDURE — 99214 OFFICE O/P EST MOD 30 MIN: CPT | Performed by: FAMILY MEDICINE

## 2019-03-14 PROCEDURE — 99213 OFFICE O/P EST LOW 20 MIN: CPT | Performed by: FAMILY MEDICINE

## 2019-03-16 NOTE — PROGRESS NOTES
CC: Here for follow-up and to reestablish with new provider.  Concerns about depression and anxiety    HPI: Established patient new to me  Eleanor presents today to establish care, 23 years old female with past medical history significant for anxiety and depression, reviewed medical problems medical records, family/social history past surgical history, reviewed medications and discussed the following concerns as follows today:    Anxiety and depression  Patient was started by her previous PCP on Zoloft 25 mg daily, and Xanax to use it only as needed for severe anxiety attacks, today she told me that since she started the Zoloft she feels that her anxiety and depression symptoms are better controlled yet not completely well controlled, she said she did not like the effect of Xanax and she has not been using it.  Denies suicidal ideations or intentions to hurt self or others     Skin lesion  Reports 3 noticing this skin lesion beside the lower edge of the right eye, not itchy or increasing in size noticed that for the first time a few weeks ago, no change in color.  Would like me to check it today.      Patient Active Problem List    Diagnosis Date Noted   • Anxiety and depression 02/13/2019   • Unintended weight loss 02/13/2019   • Eczema 04/23/2018   • Pharyngitis 05/02/2017       Current Outpatient Prescriptions   Medication Sig Dispense Refill   • sertraline (ZOLOFT) 50 MG Tab Take 1 Tab by mouth every day. 30 Tab 6   • sertraline (ZOLOFT) 25 MG tablet Take 1 Tab by mouth every day. 30 Tab 2   • Misc. Devices Misc Ensure or other high calorie Nutritional drink for For 3x/day for unintended weight loss. 90 Each 11     No current facility-administered medications for this visit.          Allergies as of 03/14/2019 - Reviewed 03/14/2019   Allergen Reaction Noted   • Red dye Swelling 10/22/2014        ROS: Denies any chest pain, Shortness of breath, Changes bowel or bladder, Lower extremity edema.    Physical  Exam:  Gen.: Well-developed, well-nourished, no apparent distress,pleasant and cooperative with the examination  Skin:  Warm and dry with good turgor. No rashes or suspicious lesions in visible areas, noticed a small lesion on the lower edge of the right eye described as small 1 mm x 1 mm erythematous lesion with rounded border pinkish in color associated with some skin dryness.  Eye: PERRLA, conjunctiva and sclera clear, lids normal  HEENT: Normocephalic/atraumatic, sinuses nontender with palpation, TMs clear, nares patent with pink mucosa and clear rhinorrhea, lips without lesions, oropharynx clear.  Neck: Trachea midline,no masses or adenopathy  Thyroid: normal consistency and size. No masses or nodules. Not tender with palpation.  Cor: Regular rate and rhythm without murmur, gallop or rub.  Lungs: Respirations unlabored.Clear to auscultation with equal breath sounds bilaterally. No wheezes, rhonchi.  Abdomen: Soft nontender without hepatosplenomegaly or masses appreciated, normoactive bowel sounds. No hernias.  Extremities: No cyanosis, clubbing or edema, Symmetrical without deformities or malformations. Pulses 2+ and symmetrical both upper and lower extremities  Lymphatic: No abnormal adenopathy of the neck groin or axillae.  Psych: Alert and oriented x 3.Normal affect, judgement,insight and memory.        Assessment and Plan.   23 y.o. female here to establish care    1. Anxiety and depression  Better controlled yet not completely controlled symptoms on 25 mg of Zoloft, discussed with the patient to increase the dose to 50 mg daily for better symptom control and encouraged to avoid the use of Xanax since she did not like the effect of it and also because of the side effects of dependency.  Discussed that SSRI are meant to do the long-term control continue follow-up with therapist    2. Skin lesion  Small lesion questionable dermatitis related to dryness does not look like skin cancer or mole.  Advised watchful  waiting for now if it starts to increase in size rapidly patient will be referred to dermatology for further biopsy and assessment.  Voices understanding        I spent > 30 min with patient face to face ,> 50% of time spent counseling , coordinating care, reviewing records , and educating patient .  Please note that this dictation was created using voice recognition software. I have made every reasonable attempt to correct obvious errors but there may be errors of grammar and content that I may have overlooked prior to finalization of this note.

## 2019-05-02 ENCOUNTER — OFFICE VISIT (OUTPATIENT)
Dept: MEDICAL GROUP | Facility: MEDICAL CENTER | Age: 24
End: 2019-05-02
Attending: FAMILY MEDICINE
Payer: MEDICAID

## 2019-05-02 VITALS
SYSTOLIC BLOOD PRESSURE: 90 MMHG | HEART RATE: 68 BPM | DIASTOLIC BLOOD PRESSURE: 60 MMHG | OXYGEN SATURATION: 97 % | BODY MASS INDEX: 19.14 KG/M2 | RESPIRATION RATE: 16 BRPM | WEIGHT: 104 LBS | TEMPERATURE: 98.3 F | HEIGHT: 62 IN

## 2019-05-02 DIAGNOSIS — I95.9 HYPOTENSION, UNSPECIFIED HYPOTENSION TYPE: ICD-10-CM

## 2019-05-02 DIAGNOSIS — F41.9 ANXIETY AND DEPRESSION: ICD-10-CM

## 2019-05-02 DIAGNOSIS — F32.A ANXIETY AND DEPRESSION: ICD-10-CM

## 2019-05-02 DIAGNOSIS — H92.03 EAR PAIN, BILATERAL: ICD-10-CM

## 2019-05-02 PROCEDURE — 99214 OFFICE O/P EST MOD 30 MIN: CPT | Performed by: FAMILY MEDICINE

## 2019-05-02 PROCEDURE — 99213 OFFICE O/P EST LOW 20 MIN: CPT | Performed by: FAMILY MEDICINE

## 2019-05-02 RX ORDER — AMOXICILLIN 500 MG/1
21 CAPSULE ORAL 3 TIMES DAILY
Qty: 30 CAP | Refills: 0 | Status: SHIPPED | OUTPATIENT
Start: 2019-05-02 | End: 2021-07-07

## 2019-05-02 NOTE — PROGRESS NOTES
Chief Complaint:   Chief Complaint   Patient presents with   • Medication Refill   • Otalgia       HPI: Established patient  Eleanor Centeno is a 23 y.o. female who presents for follow-up concerns about bilateral ear pain     Ear pain, bilateral  Patient reports that her both children where having some issues with her ear is diagnosed recently with ear infection and treated with antibiotics for the past 4 or 5 days she has been experiencing similar symptoms pain and discomfort in both ears she denies any discharge but she has had the pain is there, denies upper spelled tract symptoms sometimes she feels mild throat discomfort no cough no fever no headache denies changes in hearing     Anxiety and depression  Patient reports that she would like to continue Zoloft 50 mg daily she has been experiencing definitely improvement of her anxiety and depression and would like to continue that denies side effects    Hypotension, unspecified hypotension type    Blood pressure is on the low side today patient is denying any symptoms of hypertension like headache or lightheadedness or dizziness or tiredness her blood pressure reviewed previous readings in flowsheet usually runs on the low side        Past medical history, family history, social history and medications reviewed and updated in the record.  Today  Current medications, problem list and allergies reviewed in [facility] today  Health maintenance topics are reviewed and updated.    Patient Active Problem List    Diagnosis Date Noted   • Anxiety and depression 02/13/2019   • Unintended weight loss 02/13/2019   • Eczema 04/23/2018   • Pharyngitis 05/02/2017     Family History   Problem Relation Age of Onset   • Arthritis Mother    • Lung Disease Mother    • Cancer Mother         cervical ca   • Psychiatry Mother    • Hypertension Mother    • Alcohol/Drug Mother    • Psychiatry Sister         schizophernia   • Alcohol/Drug Sister    • Cancer Maternal Aunt    • Psychiatry Maternal  "Aunt    • Seizures Sister      Social History     Social History   • Marital status: Single     Spouse name: N/A   • Number of children: N/A   • Years of education: N/A     Occupational History   • Not on file.     Social History Main Topics   • Smoking status: Current Every Day Smoker     Packs/day: 0.50     Types: Cigarettes   • Smokeless tobacco: Never Used   • Alcohol use No   • Drug use: No   • Sexual activity: Yes     Partners: Male     Other Topics Concern   • Not on file     Social History Narrative   • No narrative on file     Current Outpatient Prescriptions   Medication Sig Dispense Refill   • amoxicillin (AMOXIL) 500 MG Cap Take 1 Cap by mouth 3 times a day. 30 Cap 0   • sertraline (ZOLOFT) 50 MG Tab Take 1 Tab by mouth every day. 30 Tab 6   • Misc. Devices Misc Ensure or other high calorie Nutritional drink for For 3x/day for unintended weight loss. 90 Each 11     No current facility-administered medications for this visit.            Review Of Systems  As documented in HPI above  PHYSICAL EXAMINATION:    BP (!) 90/60 (BP Location: Left arm, Patient Position: Sitting, BP Cuff Size: Adult)   Pulse 68   Temp 36.8 °C (98.3 °F) (Temporal)   Resp 16   Ht 1.575 m (5' 2.01\")   Wt 47.2 kg (104 lb)   SpO2 97%   BMI 19.02 kg/m²   Gen.: Well-developed, well-nourished, no apparent distress, pleasant and cooperative with the examination  HEENT: Normocephalic/atraumatic, sinuses nontender with palpation, bilateral ear exam tympanic membrane does not look very shiny and clear vague in appearance.  No erythema in the ear canal.  Uncomfortable exam.  Patient said there is some discomfort     .  neck: No JVD or bruits, no adenopathy  Cor: Regular rate and rhythm without murmur gallop or rub  Lungs: Clear to auscultation with equal breath sounds bilaterally. No wheezes, rhonchi.    Extremities: No cyanosis, clubbing or edema          ASSESSMENT/Plan:  1. Ear pain, bilateral   highly suspecting otitis media will " give patient prescription of amoxicillin to use it as directed if pain did not resolve for treatment of otitis media    amoxicillin (AMOXIL) 500 MG Cap   2. Anxiety and depression   continue Zoloft 50 mg daily controlled symptoms at this time   3. Hypotension, unspecified hypotension type   asymptomatic no red flags advised to push fluids and increase hydration and she also can increase the salt intake in diet likely this is physiological for the patient to have this low blood pressure.  Discussed red flags if she develops any symptoms of hypertension to report back to go to ER       Please note that this dictation was created using voice recognition software. I have made every reasonable attempt to correct obvious errors but there may be errors of grammar and content that I may have overlooked prior to finalization of this note.

## 2019-06-22 ENCOUNTER — OFFICE VISIT (OUTPATIENT)
Dept: URGENT CARE | Facility: CLINIC | Age: 24
End: 2019-06-22
Payer: MEDICAID

## 2019-06-22 VITALS
HEART RATE: 78 BPM | WEIGHT: 106 LBS | OXYGEN SATURATION: 98 % | DIASTOLIC BLOOD PRESSURE: 60 MMHG | RESPIRATION RATE: 15 BRPM | SYSTOLIC BLOOD PRESSURE: 106 MMHG | TEMPERATURE: 98.5 F | BODY MASS INDEX: 19.51 KG/M2 | HEIGHT: 62 IN

## 2019-06-22 DIAGNOSIS — N64.4 BREAST PAIN: ICD-10-CM

## 2019-06-22 DIAGNOSIS — H10.31 ACUTE BACTERIAL CONJUNCTIVITIS OF RIGHT EYE: ICD-10-CM

## 2019-06-22 PROCEDURE — 99204 OFFICE O/P NEW MOD 45 MIN: CPT | Performed by: PHYSICIAN ASSISTANT

## 2019-06-22 RX ORDER — POLYMYXIN B SULFATE AND TRIMETHOPRIM 1; 10000 MG/ML; [USP'U]/ML
1 SOLUTION OPHTHALMIC EVERY 4 HOURS
Qty: 10 ML | Refills: 0 | Status: SHIPPED | OUTPATIENT
Start: 2019-06-22 | End: 2021-07-07

## 2019-06-22 ASSESSMENT — ENCOUNTER SYMPTOMS
EYE PAIN: 1
PALPITATIONS: 0
EYE REDNESS: 1
EYE DISCHARGE: 1
SINUS PAIN: 0
SHORTNESS OF BREATH: 0
CHILLS: 0
HEADACHES: 0
COUGH: 0
FEVER: 0
BREAST PAIN: 1
BLURRED VISION: 0
SORE THROAT: 0
VISUAL CHANGE: 0
PHOTOPHOBIA: 0
DOUBLE VISION: 0

## 2019-06-23 NOTE — PROGRESS NOTES
Subjective:      Eleanor Centeno is a 23 y.o. female who presents with Eye Drainage            Eye Drainage   This is a new problem. The current episode started today. The problem has been unchanged. Pertinent negatives include no chest pain, chills, congestion, coughing, fever, headaches, rash, sore throat or visual change. Nothing aggravates the symptoms. She has tried nothing for the symptoms.   Breast Pain   This is a chronic problem. The current episode started more than 1 month ago. The problem occurs constantly. The problem has been waxing and waning. Pertinent negatives include no chest pain, chills, congestion, coughing, fever, headaches, rash, sore throat or visual change. Nothing aggravates the symptoms. She has tried nothing for the symptoms.       Review of Systems   Constitutional: Negative for chills, fever and malaise/fatigue.   HENT: Negative for congestion, ear pain, sinus pain and sore throat.    Eyes: Positive for pain, discharge and redness. Negative for blurred vision, double vision and photophobia.   Respiratory: Negative for cough and shortness of breath.    Cardiovascular: Negative for chest pain and palpitations.   Musculoskeletal:        Bilateral breast pain   Skin: Negative for rash.   Neurological: Negative for headaches.   All other systems reviewed and are negative.    PMH:  has a past medical history of Anxiety; Back pain; Chronic back pain; Depression; and Spina bifida occulta.  MEDS:   Current Outpatient Prescriptions:   •  polymixin-trimethoprim (POLYTRIM) 57070-6.1 UNIT/ML-% Solution, Place 1 Drop in both eyes every 4 hours., Disp: 10 mL, Rfl: 0  •  sertraline (ZOLOFT) 50 MG Tab, Take 1 Tab by mouth every day., Disp: 30 Tab, Rfl: 6  •  Misc. Devices Misc, Ensure or other high calorie Nutritional drink for For 3x/day for unintended weight loss., Disp: 90 Each, Rfl: 11  •  amoxicillin (AMOXIL) 500 MG Cap, Take 1 Cap by mouth 3 times a day. (Patient not taking: Reported on  "6/22/2019), Disp: 30 Cap, Rfl: 0  ALLERGIES:   Allergies   Allergen Reactions   • Red Dye Swelling     SURGHX:   Past Surgical History:   Procedure Laterality Date   • PB REMOVAL OF TONSILS,<12 Y/O       SOCHX:  reports that she has been smoking Cigarettes.  She has been smoking about 0.50 packs per day. She has never used smokeless tobacco. She reports that she does not drink alcohol or use drugs.  FH: Family history was reviewed, no pertinent findings to report  Medications, Allergies, and current problem list reviewed today in Epic       Objective:     /60   Pulse 78   Temp 36.9 °C (98.5 °F) (Temporal)   Resp 15   Ht 1.575 m (5' 2\")   Wt 48.1 kg (106 lb)   SpO2 98%   BMI 19.39 kg/m²      Physical Exam   Constitutional: She is oriented to person, place, and time. She appears well-developed and well-nourished. She is active.  Non-toxic appearance. She does not have a sickly appearance. She does not appear ill. No distress.   HENT:   Head: Normocephalic and atraumatic.   Right Ear: Hearing, tympanic membrane, external ear and ear canal normal.   Left Ear: Hearing, tympanic membrane, external ear and ear canal normal.   Nose: Nose normal.   Mouth/Throat: Uvula is midline, oropharynx is clear and moist and mucous membranes are normal. No oropharyngeal exudate.   Eyes: Pupils are equal, round, and reactive to light. EOM and lids are normal. Lids are everted and swept, no foreign bodies found. Right eye exhibits discharge and exudate. Right conjunctiva is injected.   Neck: Normal range of motion and full passive range of motion without pain. Neck supple.   Cardiovascular: Regular rhythm and normal heart sounds.  Exam reveals no gallop and no friction rub.    No murmur heard.  Pulmonary/Chest: Effort normal and breath sounds normal. No respiratory distress. She has no decreased breath sounds. She has no wheezes. She has no rales. She exhibits no tenderness. Right breast exhibits tenderness. Left breast " exhibits tenderness. Breasts are symmetrical. There is no breast swelling.   Genitourinary: There is breast tenderness. No breast discharge or bleeding.   Musculoskeletal: Normal range of motion. She exhibits no edema, tenderness or deformity.   Neurological: She is alert and oriented to person, place, and time.   Skin: Skin is warm, dry and intact. No rash noted.   Psychiatric: She has a normal mood and affect. Her speech is normal and behavior is normal. Judgment and thought content normal.   Vitals reviewed.              Assessment/Plan:   Patient is a 23-year-old female who presents with with 2 complaints.  #1 she complains of redness and discharge of the right eye since this morning.  Her children have recently been treated with conjunctivitis.  #2 chronic bilateral breast pain for over a month.  Patient states that she develops swelling and tenderness daily without any erythema.  She is not breast-feeding currently.  On exam she has injected right eye with visible exudate.  An MA was in the room for breast exam.  Patient has multiple areas of tenderness throughout both breasts without any significant swelling or mass palpated.  At this time we will treat for bacterial conjunctivitis.  This does not appear to be mastitis or infectious etiology.  Patient states that her OB/GYN cannot perform other diagnostic tests other than pregnancy.  She is requesting imaging.    1. Acute bacterial conjunctivitis of right eye    - polymixin-trimethoprim (POLYTRIM) 31209-6.1 UNIT/ML-% Solution; Place 1 Drop in both eyes every 4 hours.  Dispense: 10 mL; Refill: 0    2. Breast pain    - US-BREAST BILAT-COMPLETE; Future  - [id number]; Future  - REFERRAL TO RADIOLOGY    Differential diagnosis, natural history, supportive care discussed. Follow-up with primary care provider within 7-10 days, emergency room precautions discussed.  Patient and/or family appears understanding of information.  Handout and review of patients  diagnosis and treatment was discussed extensively.

## 2019-07-10 ENCOUNTER — APPOINTMENT (OUTPATIENT)
Dept: RADIOLOGY | Facility: MEDICAL CENTER | Age: 24
End: 2019-07-10
Attending: PHYSICIAN ASSISTANT
Payer: MEDICAID

## 2019-10-24 ENCOUNTER — OFFICE VISIT (OUTPATIENT)
Dept: MEDICAL GROUP | Facility: MEDICAL CENTER | Age: 24
End: 2019-10-24
Attending: FAMILY MEDICINE
Payer: MEDICAID

## 2019-10-24 VITALS
HEART RATE: 92 BPM | SYSTOLIC BLOOD PRESSURE: 98 MMHG | BODY MASS INDEX: 21.16 KG/M2 | DIASTOLIC BLOOD PRESSURE: 59 MMHG | RESPIRATION RATE: 14 BRPM | HEIGHT: 62 IN | OXYGEN SATURATION: 99 % | WEIGHT: 115 LBS | TEMPERATURE: 98.2 F

## 2019-10-24 DIAGNOSIS — M79.89 PAIN AND SWELLING OF TOE OF LEFT FOOT: ICD-10-CM

## 2019-10-24 DIAGNOSIS — M79.675 PAIN AND SWELLING OF TOE OF LEFT FOOT: ICD-10-CM

## 2019-10-24 DIAGNOSIS — F41.8 DEPRESSION WITH ANXIETY: ICD-10-CM

## 2019-10-24 PROCEDURE — 99213 OFFICE O/P EST LOW 20 MIN: CPT | Performed by: FAMILY MEDICINE

## 2019-10-24 PROCEDURE — 99203 OFFICE O/P NEW LOW 30 MIN: CPT | Performed by: FAMILY MEDICINE

## 2019-10-24 PROCEDURE — 99214 OFFICE O/P EST MOD 30 MIN: CPT | Performed by: FAMILY MEDICINE

## 2019-10-24 RX ORDER — SERTRALINE HYDROCHLORIDE 25 MG/1
25 TABLET, FILM COATED ORAL DAILY
Qty: 30 TAB | Refills: 11 | Status: SHIPPED | OUTPATIENT
Start: 2019-10-24 | End: 2021-07-07

## 2019-10-24 RX ORDER — NAPROXEN 375 MG/1
375 TABLET ORAL
Qty: 25 TAB | Refills: 0 | Status: SHIPPED | OUTPATIENT
Start: 2019-10-24 | End: 2021-07-07

## 2019-10-24 RX ORDER — AMOXICILLIN AND CLAVULANATE POTASSIUM 875; 125 MG/1; MG/1
1 TABLET, FILM COATED ORAL 2 TIMES DAILY
Qty: 14 QUANTITY SUFFICIENT | Refills: 0 | Status: SHIPPED | OUTPATIENT
Start: 2019-10-24 | End: 2021-07-07

## 2019-10-24 NOTE — PROGRESS NOTES
Chief Complaint:   Chief Complaint   Patient presents with   • Foot Problem       HPI:Established patient   Eleanor Centeno is a 23 y.o. female who presents for evaluation of pain on the left foot and 2 middle toes       Pain and swelling of toe of left foot  Patient reports that around few days back while she was at work wearing a high heel shoes, she felt all of a sudden that the left 2 middle toes are tender and she feels like she wants to crack them but she could not, after that she started to develop pain and discomfort that continues to become extending to the sole of the foot in that area associated with swelling, she has been wrapping them together to help control the pain.  She denies any trauma or incident related to that, reports tenderness in the area.  Denies history of gout or other joint problem or pain.    Depression with anxiety    Reports that Zoloft has been controlling her depression and anxiety yet she feels that she sleeps a lot, she is on 50 mg daily.  She denies intentions to hurt self or others        Past medical history, family history, social history and medications reviewed and updated in the record. Today   Current medications, problem list and allergies reviewed in [facility] today   Health maintenance topics are reviewed and updated.    Patient Active Problem List    Diagnosis Date Noted   • Anxiety and depression 02/13/2019   • Unintended weight loss 02/13/2019   • Eczema 04/23/2018   • Pharyngitis 05/02/2017     Family History   Problem Relation Age of Onset   • Arthritis Mother    • Lung Disease Mother    • Cancer Mother         cervical ca   • Psychiatric Illness Mother    • Hypertension Mother    • Alcohol/Drug Mother    • Psychiatric Illness Sister         schizophernia   • Alcohol/Drug Sister    • Cancer Maternal Aunt    • Psychiatric Illness Maternal Aunt    • Seizures Sister      Social History     Socioeconomic History   • Marital status: Single     Spouse name: Not on file   •  Number of children: Not on file   • Years of education: Not on file   • Highest education level: Not on file   Occupational History   • Not on file   Social Needs   • Financial resource strain: Not on file   • Food insecurity:     Worry: Not on file     Inability: Not on file   • Transportation needs:     Medical: Not on file     Non-medical: Not on file   Tobacco Use   • Smoking status: Current Every Day Smoker     Packs/day: 0.50     Types: Cigarettes   • Smokeless tobacco: Never Used   Substance and Sexual Activity   • Alcohol use: No   • Drug use: No   • Sexual activity: Yes     Partners: Male   Lifestyle   • Physical activity:     Days per week: Not on file     Minutes per session: Not on file   • Stress: Not on file   Relationships   • Social connections:     Talks on phone: Not on file     Gets together: Not on file     Attends Denominational service: Not on file     Active member of club or organization: Not on file     Attends meetings of clubs or organizations: Not on file     Relationship status: Not on file   • Intimate partner violence:     Fear of current or ex partner: Not on file     Emotionally abused: Not on file     Physically abused: Not on file     Forced sexual activity: Not on file   Other Topics Concern   • Not on file   Social History Narrative   • Not on file       Current Outpatient Medications   Medication Sig Dispense Refill   • naproxen (NAPROSYN) 375 MG Tab Take 1 Tab by mouth 3 times a day, with meals. 25 Tab 0   • amoxicillin-clavulanate (AUGMENTIN) 875-125 MG Tab Take 1 Tab by mouth 2 times a day. 14 Quantity Sufficient 0   • sertraline (ZOLOFT) 25 MG tablet Take 1 Tab by mouth every day. 30 Tab 11   • polymixin-trimethoprim (POLYTRIM) 09919-7.1 UNIT/ML-% Solution Place 1 Drop in both eyes every 4 hours. 10 mL 0   • amoxicillin (AMOXIL) 500 MG Cap Take 1 Cap by mouth 3 times a day. (Patient not taking: Reported on 6/22/2019) 30 Cap 0   • Misc. Devices Misc Ensure or other high calorie  "Nutritional drink for For 3x/day for unintended weight loss. 90 Each 11     No current facility-administered medications for this visit.          Review Of Systems  As documented in HPI above  PHYSICAL EXAMINATION:    BP (!) 98/59 (BP Location: Left arm, Patient Position: Sitting, BP Cuff Size: Small adult) Comment: pt states her bp is always low  Pulse 92   Temp 36.8 °C (98.2 °F) (Temporal)   Resp 14   Ht 1.575 m (5' 2\")   Wt 52.2 kg (115 lb)   SpO2 99%   BMI 21.03 kg/m²   Gen.: Well-developed, well-nourished, no apparent distress, pleasant and cooperative with the examination  HEENT: Normocephalic/atraumatic,    Neck: No JVD or bruits, no adenopathy  Cor: Regular rate and rhythm without murmur gallop or rub  Left foot exam: The 2 toes in the middle second and third and fourth are not swollen yet the area of the sole of the foot underneath them is swollen and tender, feels like cystic collection.  Without evidence of warmth or change in color  Extremities: No cyanosis, clubbing or edema          ASSESSMENT/Plan:  1. Pain and swelling of toe of left foot   this is a new problem, suspecting inflammation versus infection, of unclear etiology, will do x-ray, treat with nonsteroidal anti-inflammatory and antibiotics, unlikely to be gout.  Follow-up as directed  DX-FOOT-COMPLETE 3+ LEFT    naproxen (NAPROSYN) 375 MG Tab    amoxicillin-clavulanate (AUGMENTIN) 875-125 MG Tab   2. Depression with anxiety   reduce the dose because of reporting increased sleep with a 50 mg patient to start taking 25 for control of her depression and anxiety symptoms.  No red flags    sertraline (ZOLOFT) 25 MG tablet       Please note that this dictation was created using voice recognition software. I have made every reasonable attempt to correct obvious errors but there may be errors of grammar and content that I may have overlooked prior to finalization of this note.       "

## 2019-11-08 ENCOUNTER — TELEPHONE (OUTPATIENT)
Dept: MEDICAL GROUP | Facility: MEDICAL CENTER | Age: 24
End: 2019-11-08

## 2019-11-08 NOTE — TELEPHONE ENCOUNTER
Spoke with patient about no show to appointment yesterday 11/7/19.  Explained this was her first no show and the no show policy.

## 2021-07-07 ENCOUNTER — OFFICE VISIT (OUTPATIENT)
Dept: MEDICAL GROUP | Facility: MEDICAL CENTER | Age: 26
End: 2021-07-07
Attending: PHYSICIAN ASSISTANT
Payer: MEDICAID

## 2021-07-07 VITALS
OXYGEN SATURATION: 99 % | TEMPERATURE: 97.7 F | WEIGHT: 100.2 LBS | HEART RATE: 73 BPM | HEIGHT: 62 IN | RESPIRATION RATE: 16 BRPM | SYSTOLIC BLOOD PRESSURE: 90 MMHG | BODY MASS INDEX: 18.44 KG/M2 | DIASTOLIC BLOOD PRESSURE: 52 MMHG

## 2021-07-07 DIAGNOSIS — R42 DIZZINESS: ICD-10-CM

## 2021-07-07 DIAGNOSIS — M89.8X1 CHRONIC SCAPULAR PAIN: ICD-10-CM

## 2021-07-07 DIAGNOSIS — Z30.432 ENCOUNTER FOR IUD REMOVAL: ICD-10-CM

## 2021-07-07 DIAGNOSIS — G89.29 CHRONIC SCAPULAR PAIN: ICD-10-CM

## 2021-07-07 PROBLEM — J02.9 PHARYNGITIS: Status: RESOLVED | Noted: 2017-05-02 | Resolved: 2021-07-07

## 2021-07-07 PROCEDURE — 99214 OFFICE O/P EST MOD 30 MIN: CPT | Performed by: PHYSICIAN ASSISTANT

## 2021-07-07 PROCEDURE — 99213 OFFICE O/P EST LOW 20 MIN: CPT | Performed by: PHYSICIAN ASSISTANT

## 2021-07-07 NOTE — ASSESSMENT & PLAN NOTE
"Eleanor presents today to establish care. She has a Mirena IUD that was placed in 2017. She reports that she has been experiencing several issues since placement which includes: Irregular menstrual cycles, a miscarriage and dyspareunia that she describes as a \"cramping feeling.\" She reports that her fiance and her are talking about conceiving and therefore, she would like for the IUD to be removed. She is not currently established with an OBGYN.    "

## 2021-07-07 NOTE — ASSESSMENT & PLAN NOTE
"Onset/VIRGIE: Since Highschool.   Location: Bilateral shoulder blades, L > R.  Duration: \"Intermittent.\"   Character: \"Sharp, popping, cracking and grinding.\"   Alleviating factors: Avoiding aggravating factors.   Aggravating factors: Lifting arm up and performing ROM.   Radiation: None.   Treatments tried: Massage - helpful.   No red flag signs.  "

## 2021-07-07 NOTE — PROGRESS NOTES
"Chief Complaint   Patient presents with   • Establish Care   • Dizziness   • Shoulder Pain       Subjective:     HPI:   Eleanor Centeno is a 25 y.o. female here to establish care and to discuss the evaluation and management of:    Dizziness  Eleanor presents today to establish care.  She reports a chronic history of symptoms that include dizziness that is followed by \" the body getting hot\", fast heart rate, \"fuzzy sensation\" (full body tingling) and diaphoresis that leads to hearing and vision loss in which she \"blacks out\" and comes to fairly quickly.  She states that she feels the sensation of her body cooling down and she then returns back to normal.  Her fiancé, who has witnessed these episodes has told her that she does not respond normally immediately after an episode.  It takes a few minutes before she returns to baseline.  She reports that these episodes vary in occurrence in which it can happen a couple times a month vs once every 2 months.  Aggravating factors include stretching arms above the head while standing or standing up too fast.  Alleviating factors include avoiding the aggravating factors.  However, she does report that there are times that these episodes happen at random and there is not necessarily a trigger.  She reports that she stays adequately hydrated by drinking 64 fluid ounces a day.  Her diet is fairly poor.  She states that she has a history of anorexia in which she does not have a great relationship with food as food typically oftentimes more than not makes her nauseous.  She does have a personal history of exercise-induced asthma.  Family history of grand mal and absence seizures.  This has never been worked up previously.    Chronic scapular pain  Onset/VIRGIE: Since Highschool.   Location: Bilateral shoulder blades, L > R.  Duration: \"Intermittent.\"   Character: \"Sharp, popping, cracking and grinding.\"   Alleviating factors: Avoiding aggravating factors.   Aggravating factors: Lifting " "arm up and performing ROM.   Radiation: None.   Treatments tried: Massage - helpful.   No red flag signs.    Encounter for IUD removal  Eleanor presents today to establish care. She has a Mirena IUD that was placed in 2017. She reports that she has been experiencing several issues since placement which includes: Irregular menstrual cycles, a miscarriage and dyspareunia that she describes as a \"cramping feeling.\" She reports that her fiance and her are talking about conceiving and therefore, she would like for the IUD to be removed. She is not currently established with an OBGYN.      ROS  See HPI.     Allergies   Allergen Reactions   • Red Dye Swelling       Current medicines (including changes today)  No current outpatient medications on file.     No current facility-administered medications for this visit.     She  has a past medical history of Anxiety, Back pain, Chronic back pain, Depression, and Spina bifida occulta.  She  has a past surgical history that includes pr removal of tonsils,<12 y/o.  Social History     Tobacco Use   • Smoking status: Current Every Day Smoker     Packs/day: 0.50     Types: Cigarettes   • Smokeless tobacco: Never Used   • Tobacco comment: Less than a pack a day.    Vaping Use   • Vaping Use: Never used   Substance Use Topics   • Alcohol use: No   • Drug use: No       Family History   Problem Relation Age of Onset   • Arthritis Mother    • Lung Disease Mother    • Cancer Mother         cervical ca   • Psychiatric Illness Mother    • Hypertension Mother    • Alcohol/Drug Mother    • Psychiatric Illness Sister         schizophernia   • Alcohol/Drug Sister    • Cancer Maternal Aunt    • Psychiatric Illness Maternal Aunt    • Seizures Sister      Family Status   Relation Name Status   • Mo  Alive   • Sis  (Not Specified)   • MAunt  (Not Specified)   • Fa     • Sis  (Not Specified)       Patient Active Problem List    Diagnosis Date Noted   • Dizziness 2021   • Chronic scapular " "pain 07/07/2021   • Anxiety and depression 02/13/2019   • Unintended weight loss 02/13/2019   • Eczema 04/23/2018   • Encounter for IUD removal 05/02/2017        Objective:     BP (!) 90/52 (BP Location: Left arm, Patient Position: Sitting, BP Cuff Size: Adult)   Pulse 73   Temp 36.5 °C (97.7 °F) (Temporal)   Resp 16   Ht 1.575 m (5' 2\")   Wt 45.5 kg (100 lb 3.2 oz)   SpO2 99%  Body mass index is 18.33 kg/m².    Physical Exam:  Physical Exam  Vitals reviewed.   Constitutional:       Appearance: Normal appearance.   HENT:      Head: Normocephalic.      Right Ear: External ear normal.      Left Ear: External ear normal.   Eyes:      Pupils: Pupils are equal, round, and reactive to light.   Neck:      Thyroid: No thyromegaly.   Cardiovascular:      Rate and Rhythm: Normal rate and regular rhythm.      Heart sounds: Normal heart sounds.   Pulmonary:      Effort: Pulmonary effort is normal.      Breath sounds: Normal breath sounds.   Musculoskeletal:         General: Normal range of motion.      Right shoulder: Crepitus present.      Left shoulder: Crepitus present.      Cervical back: Normal range of motion.   Lymphadenopathy:      Cervical: No cervical adenopathy.      Upper Body:      Right upper body: No supraclavicular adenopathy.      Left upper body: No supraclavicular adenopathy.   Skin:     General: Skin is warm and dry.   Neurological:      General: No focal deficit present.      Mental Status: She is alert and oriented to person, place, and time.      Gait: Gait is intact.   Psychiatric:         Mood and Affect: Mood and affect normal.         Behavior: Behavior normal.         Judgment: Judgment normal.        Assessment and Plan:     The following treatment plan was discussed:    1. Dizziness  - This is a chronic condition.  - No red flag signs on exam.  -Plan: We will start by obtaining labs to evaluate for anemia, thyroid disorder, hypoglycemia and vitamin deficiencies.  If her labs come back normal " we will proceed with a Holter monitor for further evaluation.  I will reach out to the patient via phone once we have received and reviewed her lab results.  - CBC WITH DIFFERENTIAL; Future  - Comp Metabolic Panel; Future  - FERRITIN; Future  - IRON/TOTAL IRON BIND; Future  - HEMOGLOBIN A1C; Future  - Lipid Profile; Future  - TSH WITH REFLEX TO FT4; Future  - VITAMIN D,25 HYDROXY; Future  - VITAMIN B12; Future  - FOLATE; Future  - URINALYSIS; Future    2. Chronic scapular pain  - This is a chronic condition.  - Plan: Obtain plain films of the bilateral scapula to evaluate for bony/osseous abnormalities.  - DX-SCAPULA LEFT; Future  - DX-SCAPULA RIGHT; Future    3. Encounter for IUD removal  - REFERRAL TO OB/GYN     Any change or worsening of signs or symptoms, patient encouraged to follow-up or report to emergency room for further evaluation. Patient verbalizes understanding and agrees.    Follow-Up: Return if symptoms worsen or fail to improve.      PLEASE NOTE: This dictation was created using voice recognition software. I have made every reasonable attempt to correct obvious errors, but I expect that there are errors of grammar and possibly content that I did not discover before finalizing the note.

## 2021-07-07 NOTE — ASSESSMENT & PLAN NOTE
"Eleanor presents today to establish care.  She reports a chronic history of symptoms that include dizziness that is followed by \" the body getting hot\", fast heart rate, \"fuzzy sensation\" (full body tingling) and diaphoresis that leads to hearing and vision loss in which she \"blacks out\" and comes to fairly quickly.  She states that she feels the sensation of her body cooling down and she then returns back to normal.  Her fiancé, who has witnessed these episodes has told her that she does not respond normally immediately after an episode.  It takes a few minutes before she returns to baseline.  She reports that these episodes vary in occurrence in which it can happen a couple times a month vs once every 2 months.  Aggravating factors include stretching arms above the head while standing or standing up too fast.  Alleviating factors include avoiding the aggravating factors.  However, she does report that there are times that these episodes happen at random and there is not necessarily a trigger.  She reports that she stays adequately hydrated by drinking 64 fluid ounces a day.  Her diet is fairly poor.  She states that she has a history of anorexia in which she does not have a great relationship with food as food typically oftentimes more than not makes her nauseous.  She does have a personal history of exercise-induced asthma.  Family history of grand mal and absence seizures.  This has never been worked up previously.      "

## 2021-07-08 ENCOUNTER — HOSPITAL ENCOUNTER (OUTPATIENT)
Dept: RADIOLOGY | Facility: MEDICAL CENTER | Age: 26
End: 2021-07-08
Attending: PHYSICIAN ASSISTANT
Payer: MEDICAID

## 2021-07-08 DIAGNOSIS — M89.8X1 CHRONIC SCAPULAR PAIN: ICD-10-CM

## 2021-07-08 DIAGNOSIS — G89.29 CHRONIC SCAPULAR PAIN: ICD-10-CM

## 2021-07-08 PROCEDURE — 73010 X-RAY EXAM OF SHOULDER BLADE: CPT | Mod: LT

## 2021-07-08 PROCEDURE — 73010 X-RAY EXAM OF SHOULDER BLADE: CPT | Mod: RT

## 2021-08-19 ENCOUNTER — GYNECOLOGY VISIT (OUTPATIENT)
Dept: OBGYN | Facility: CLINIC | Age: 26
End: 2021-08-19
Payer: MEDICAID

## 2021-08-19 VITALS
WEIGHT: 100 LBS | DIASTOLIC BLOOD PRESSURE: 60 MMHG | BODY MASS INDEX: 18.88 KG/M2 | SYSTOLIC BLOOD PRESSURE: 98 MMHG | HEIGHT: 61 IN

## 2021-08-19 DIAGNOSIS — T83.32XA INTRAUTERINE CONTRACEPTIVE DEVICE THREADS LOST, INITIAL ENCOUNTER: Primary | ICD-10-CM

## 2021-08-19 DIAGNOSIS — Z12.4 CERVICAL CANCER SCREENING: ICD-10-CM

## 2021-08-19 LAB
INT CON NEG: NEGATIVE
INT CON POS: POSITIVE
POC URINE PREGNANCY TEST: NEGATIVE

## 2021-08-19 PROCEDURE — 99203 OFFICE O/P NEW LOW 30 MIN: CPT | Performed by: NURSE PRACTITIONER

## 2021-08-19 PROCEDURE — 81025 URINE PREGNANCY TEST: CPT | Performed by: NURSE PRACTITIONER

## 2021-08-19 NOTE — NON-PROVIDER
Patient here for annual exam/ IUD removal counseling  Last pap done/result:2017=negative  LMP:  irregular  BCM: Mirena IUD 12/2017. Desires to get pregnant  Last mammogram, if applicable: n/a  Phone number: [phone]  Pharmacy verified

## 2021-08-19 NOTE — PROGRESS NOTES
HPI Comments:  Eleanor Centeno is a 25 y.o. female who presents for problem gyn visit: discuss removal of IUD.  Pt has Mirena IUD since 2017.  Has noticed for the past year painful sex.  A lot of cramping, regular periods.  And inability to stay lubricated. Would like IUD removed.  Has had a miscarriage with it in place and would like to conceive.  Has not been able to check her strings. No LMP recorded. Patient has had an implant.      Review of Systems :  Constitutional: none  EENT: none  Cardio: none  Resp: none  GI: none  : none  Pertinent positives documented in HPI and all other systems reviewed & are negative    All PMH, PSH, allergies, social history and FH reviewed and updated today:  Past Medical History:   Diagnosis Date   • Anxiety    • Asthma    • Back pain    • Chronic back pain     related to scolo   • Depression    • Migraine    • Spina bifida occulta      Past Surgical History:   Procedure Laterality Date   • PB REMOVAL OF TONSILS,<12 Y/O       Red dye  Social History     Socioeconomic History   • Marital status: Single     Spouse name: Not on file   • Number of children: Not on file   • Years of education: Not on file   • Highest education level: Not on file   Occupational History   • Not on file   Tobacco Use   • Smoking status: Current Every Day Smoker     Packs/day: 0.50     Types: Cigarettes   • Smokeless tobacco: Never Used   • Tobacco comment: Less than a pack a day.    Vaping Use   • Vaping Use: Never used   Substance and Sexual Activity   • Alcohol use: No   • Drug use: No   • Sexual activity: Yes     Partners: Male     Birth control/protection: I.U.D.   Other Topics Concern   • Not on file   Social History Narrative   • Not on file     Social Determinants of Health     Financial Resource Strain:    • Difficulty of Paying Living Expenses:    Food Insecurity:    • Worried About Running Out of Food in the Last Year:    • Ran Out of Food in the Last Year:    Transportation Needs:    • Lack of  "Transportation (Medical):    • Lack of Transportation (Non-Medical):    Physical Activity:    • Days of Exercise per Week:    • Minutes of Exercise per Session:    Stress:    • Feeling of Stress :    Social Connections:    • Frequency of Communication with Friends and Family:    • Frequency of Social Gatherings with Friends and Family:    • Attends Sabianism Services:    • Active Member of Clubs or Organizations:    • Attends Club or Organization Meetings:    • Marital Status:    Intimate Partner Violence:    • Fear of Current or Ex-Partner:    • Emotionally Abused:    • Physically Abused:    • Sexually Abused:      Family History   Problem Relation Age of Onset   • Arthritis Mother    • Lung Disease Mother    • Cancer Mother         cervical ca   • Psychiatric Illness Mother    • Hypertension Mother    • Alcohol/Drug Mother    • Ovarian Cancer Mother    • Psychiatric Illness Sister         schizophernia   • Alcohol/Drug Sister    • Cancer Maternal Aunt    • Psychiatric Illness Maternal Aunt    • Seizures Sister      Medications:   No current [facility]-ordered outpatient medications on file.     No current [facility]-ordered facility-administered medications on file.          Objective:   Vital measurements:  BP (!) 98/60 (BP Location: Right arm, Patient Position: Sitting, BP Cuff Size: Adult)   Ht 1.549 m (5' 1\")   Wt 45.4 kg (100 lb)   Body mass index is 18.89 kg/m². (Goal BM I>18 <25)    Physical Exam   Nursing note and vitals reviewed.  Constitutional: She is oriented to person, place, and time. She appears well-developed and well-nourished. No distress.     Abdominal: Soft. Bowel sounds are normal. She exhibits no distension and no mass. No tenderness. She has no rebound and no guarding.     Breast:  Symmetrical, normal consistency without masses.    Genitourinary:  Pelvic exam was performed with patient supine.  External genitalia with no abnormal pigmentation, labial fusion,rash, tenderness, lesion or injury to the " labia bilaterally.  Vagina is moist with no lesions, foul discharge, erythema, tenderness or bleeding. No foreign body around the vagina or signs of injury.   Cervix exhibits no motion tenderness, no discharge and no friability. No IUD strings visualized.  Uterus is not deviated, not enlarged, not fixed and not tender.  Right adnexum displays no mass, no tenderness and no fullness. Left adnexum displays no mass, no tenderness and no fullness.     Neurological: She is alert and oriented to person, place, and time. She exhibits normal muscle tone.     Skin: Skin is warm and dry. No rash noted. She is not diaphoretic. No erythema. No pallor.     Psychiatric: She has a normal mood and affect. Her behavior is normal. Judgment and thought content normal.        Assessment:     1. Intrauterine contraceptive device threads lost, initial encounter  US-PELVIC COMPLETE (TRANSABDOMINAL/TRANSVAGINAL) (COMBO)   2. Cervical cancer screening  THINPREP RFLX HPV ASCUS W/CTNG         Plan:   Gyn exam performed   Monthly SBE.  Counseling: breast self exam and STD prevention, and preconception counseling.  US ordered.  Encouraged PNV w folic acid and DHA.  F/u after US.    Chaperone offered and provided by Germania Ortiz MA.

## 2021-08-26 DIAGNOSIS — Z12.4 CERVICAL CANCER SCREENING: ICD-10-CM

## 2021-09-15 ENCOUNTER — HOSPITAL ENCOUNTER (OUTPATIENT)
Dept: RADIOLOGY | Facility: MEDICAL CENTER | Age: 26
End: 2021-09-15
Attending: NURSE PRACTITIONER
Payer: MEDICAID

## 2021-09-15 DIAGNOSIS — T83.32XA INTRAUTERINE CONTRACEPTIVE DEVICE THREADS LOST, INITIAL ENCOUNTER: ICD-10-CM

## 2021-09-15 PROCEDURE — 76830 TRANSVAGINAL US NON-OB: CPT

## 2021-09-17 ENCOUNTER — TELEPHONE (OUTPATIENT)
Dept: OBGYN | Facility: CLINIC | Age: 26
End: 2021-09-17

## 2021-09-17 NOTE — TELEPHONE ENCOUNTER
----- Message from STU Hess sent at 9/16/2021  1:29 PM PDT -----  IUD noted.       Good afternoon Eleanor --     Your ultrasound did show your IUD was in the correct place.  If you still desire removal, please make an appt with our doctors for removal.  Please let me know if you have any questions.     Best --     Michelle   Written by CECILE HessRRIKI on 9/16/2021  1:29 PM PDT      Called pt and informed as above. Pt desires IUD removal. Pt scheduled for IUD removal with Dr. Emery on 10/14/2021 at 1400. Pt agreed and verbalized understanding.

## 2021-10-14 ENCOUNTER — GYNECOLOGY VISIT (OUTPATIENT)
Dept: OBGYN | Facility: CLINIC | Age: 26
End: 2021-10-14
Payer: MEDICAID

## 2021-10-14 VITALS — SYSTOLIC BLOOD PRESSURE: 90 MMHG | DIASTOLIC BLOOD PRESSURE: 62 MMHG | WEIGHT: 100 LBS | BODY MASS INDEX: 18.89 KG/M2

## 2021-10-14 DIAGNOSIS — T83.39XA RETAINED INTRAUTERINE CONTRACEPTIVE DEVICE (IUD): ICD-10-CM

## 2021-10-14 PROCEDURE — 99213 OFFICE O/P EST LOW 20 MIN: CPT | Performed by: OBSTETRICS & GYNECOLOGY

## 2021-10-14 RX ORDER — DOXYCYCLINE HYCLATE 100 MG/1
100 CAPSULE ORAL 2 TIMES DAILY
Qty: 6 EACH | Refills: 0 | Status: SHIPPED | OUTPATIENT
Start: 2021-10-14 | End: 2021-10-17

## 2021-10-14 NOTE — NON-PROVIDER
Pt here for IUD removal of Mirena   Pt states the removal is to try to conceive. With IUD cramps are intolerable and intercourse has been painful.    Good# [phone]   Pharmacy verified

## 2021-10-14 NOTE — PROGRESS NOTES
Chief Complaint   Patient presents with   • IUD Removal     Mirena    GYN: IUD removal    History of present illness:   25 y.o.  presents with above chief complaint.  Patient underwent an ultrasound prior to IUD removal as the strings were not seen at the cervical os.  Would like IUD removed as she is planning on expanding her family.    ROS: Pertinent positives documented in HPI and all other systems reviewed & are negative    POBHx:  3 para 2-0-1-2    PGYNHx: None    All PMH, PSH, meds, allergies, social history and FH reviewed and updated today:  Past Medical History:   Diagnosis Date   • Anxiety    • Asthma    • Back pain    • Chronic back pain     related to scolo   • Depression    • Migraine    • Spina bifida occulta        Past Surgical History:   Procedure Laterality Date   • PB REMOVAL OF TONSILS,<12 Y/O         Allergies:   Allergies   Allergen Reactions   • Red Dye Swelling       Social History     Socioeconomic History   • Marital status: Single     Spouse name: Not on file   • Number of children: Not on file   • Years of education: Not on file   • Highest education level: Not on file   Occupational History   • Not on file   Tobacco Use   • Smoking status: Current Every Day Smoker     Packs/day: 0.50     Types: Cigarettes   • Smokeless tobacco: Never Used   • Tobacco comment: Less than a pack a day.    Vaping Use   • Vaping Use: Never used   Substance and Sexual Activity   • Alcohol use: No   • Drug use: No   • Sexual activity: Yes     Partners: Male     Birth control/protection: I.U.D.   Other Topics Concern   • Not on file   Social History Narrative   • Not on file     Social Determinants of Health     Financial Resource Strain:    • Difficulty of Paying Living Expenses:    Food Insecurity:    • Worried About Running Out of Food in the Last Year:    • Ran Out of Food in the Last Year:    Transportation Needs:    • Lack of Transportation (Medical):    • Lack of Transportation  (Non-Medical):    Physical Activity:    • Days of Exercise per Week:    • Minutes of Exercise per Session:    Stress:    • Feeling of Stress :    Social Connections:    • Frequency of Communication with Friends and Family:    • Frequency of Social Gatherings with Friends and Family:    • Attends Worship Services:    • Active Member of Clubs or Organizations:    • Attends Club or Organization Meetings:    • Marital Status:    Intimate Partner Violence:    • Fear of Current or Ex-Partner:    • Emotionally Abused:    • Physically Abused:    • Sexually Abused:        Family History   Problem Relation Age of Onset   • Arthritis Mother    • Lung Disease Mother    • Cancer Mother         cervical ca   • Psychiatric Illness Mother    • Hypertension Mother    • Alcohol/Drug Mother    • Ovarian Cancer Mother    • Psychiatric Illness Sister         schizophernia   • Alcohol/Drug Sister    • Cancer Maternal Aunt    • Psychiatric Illness Maternal Aunt    • Seizures Sister        Physical exam:  BP (!) 90/62 (BP Location: Right arm, Patient Position: Sitting, BP Cuff Size: Adult)   Wt 45.4 kg (100 lb)     GENERAL APPEARANCE: healthy, alert, no distress  ABDOMEN Abdomen soft, non-tender. BS normal. No masses,  No organomegaly  FEMALE GYN: normal female external genitalia without lesions, BUS normal without lesions, vaginal mucosa pink and moist, no vaginal discharge noted, cervix normal in appearance without lesions, no CMT, urethra is normal without discharge or scarring, no bladder fullness or masses, normal anus and perineum. Uterus mobile, normal size, and nontender. Ovaries normal size and nontender bilaterally. No palpable masses.  No inguinal hernia present .  EXTREMITIES:negative clubbing, cyanosis, edema    NEURO Awake, alert and oriented x 3, Normal gait, no sensory deficits  SKIN No rashes, or ulcers or lesions seen  PSYCHIATRIC: Patient shows appropriate affect, is alert and oriented x3, intact judgment and  insight.    Procedure:    Attempt to remove the IUD.  Unsuccessful.  Attempted for approximately 7 to 10 minutes.    Assessment:  1. Retained intrauterine contraceptive device (IUD)         Plan:    Unsuccessful attempt at removal of IUD in the office.  We will move towards hysteroscopy with IUD removal in the operating room.  Request sent.    Doxycycline 100 g p.o. twice daily for 3 days given due to uterine instrumentation.    All questions answered

## 2021-10-28 ENCOUNTER — PRE-ADMISSION TESTING (OUTPATIENT)
Dept: ADMISSIONS | Facility: MEDICAL CENTER | Age: 26
End: 2021-10-28
Attending: OBSTETRICS & GYNECOLOGY
Payer: MEDICAID

## 2021-10-29 ENCOUNTER — PRE-ADMISSION TESTING (OUTPATIENT)
Dept: ADMISSIONS | Facility: MEDICAL CENTER | Age: 26
End: 2021-10-29
Attending: OBSTETRICS & GYNECOLOGY
Payer: MEDICAID

## 2021-10-29 DIAGNOSIS — Z01.812 PRE-OPERATIVE LABORATORY EXAMINATION: ICD-10-CM

## 2021-10-29 PROCEDURE — U0005 INFEC AGEN DETEC AMPLI PROBE: HCPCS

## 2021-10-29 PROCEDURE — U0003 INFECTIOUS AGENT DETECTION BY NUCLEIC ACID (DNA OR RNA); SEVERE ACUTE RESPIRATORY SYNDROME CORONAVIRUS 2 (SARS-COV-2) (CORONAVIRUS DISEASE [COVID-19]), AMPLIFIED PROBE TECHNIQUE, MAKING USE OF HIGH THROUGHPUT TECHNOLOGIES AS DESCRIBED BY CMS-2020-01-R: HCPCS

## 2021-10-30 LAB — COVID ORDER STATUS COVID19: NORMAL

## 2021-10-31 LAB
SARS-COV-2 RNA RESP QL NAA+PROBE: NOTDETECTED
SPECIMEN SOURCE: NORMAL

## 2021-11-02 ENCOUNTER — ANESTHESIA EVENT (OUTPATIENT)
Dept: SURGERY | Facility: MEDICAL CENTER | Age: 26
End: 2021-11-02
Payer: MEDICAID

## 2021-11-03 ENCOUNTER — ANESTHESIA (OUTPATIENT)
Dept: SURGERY | Facility: MEDICAL CENTER | Age: 26
End: 2021-11-03
Payer: MEDICAID

## 2021-11-03 ENCOUNTER — HOSPITAL ENCOUNTER (OUTPATIENT)
Facility: MEDICAL CENTER | Age: 26
End: 2021-11-03
Attending: OBSTETRICS & GYNECOLOGY | Admitting: OBSTETRICS & GYNECOLOGY
Payer: MEDICAID

## 2021-11-03 VITALS
DIASTOLIC BLOOD PRESSURE: 63 MMHG | WEIGHT: 99.21 LBS | OXYGEN SATURATION: 98 % | HEIGHT: 61 IN | HEART RATE: 83 BPM | TEMPERATURE: 97.4 F | RESPIRATION RATE: 20 BRPM | SYSTOLIC BLOOD PRESSURE: 96 MMHG | BODY MASS INDEX: 18.73 KG/M2

## 2021-11-03 LAB — HCG UR QL: NEGATIVE

## 2021-11-03 PROCEDURE — 501838 HCHG SUTURE GENERAL: Performed by: OBSTETRICS & GYNECOLOGY

## 2021-11-03 PROCEDURE — 700105 HCHG RX REV CODE 258: Performed by: OBSTETRICS & GYNECOLOGY

## 2021-11-03 PROCEDURE — A9270 NON-COVERED ITEM OR SERVICE: HCPCS | Performed by: ANESTHESIOLOGY

## 2021-11-03 PROCEDURE — 81025 URINE PREGNANCY TEST: CPT

## 2021-11-03 PROCEDURE — 700102 HCHG RX REV CODE 250 W/ 637 OVERRIDE(OP): Performed by: ANESTHESIOLOGY

## 2021-11-03 PROCEDURE — 160025 RECOVERY II MINUTES (STATS): Performed by: OBSTETRICS & GYNECOLOGY

## 2021-11-03 PROCEDURE — 700111 HCHG RX REV CODE 636 W/ 250 OVERRIDE (IP): Performed by: ANESTHESIOLOGY

## 2021-11-03 PROCEDURE — 502587 HCHG PACK, D&C: Performed by: OBSTETRICS & GYNECOLOGY

## 2021-11-03 PROCEDURE — 58301 REMOVE INTRAUTERINE DEVICE: CPT | Performed by: OBSTETRICS & GYNECOLOGY

## 2021-11-03 PROCEDURE — 160046 HCHG PACU - 1ST 60 MINS PHASE II: Performed by: OBSTETRICS & GYNECOLOGY

## 2021-11-03 PROCEDURE — 160002 HCHG RECOVERY MINUTES (STAT): Performed by: OBSTETRICS & GYNECOLOGY

## 2021-11-03 PROCEDURE — 160009 HCHG ANES TIME/MIN: Performed by: OBSTETRICS & GYNECOLOGY

## 2021-11-03 PROCEDURE — 160038 HCHG SURGERY MINUTES - EA ADDL 1 MIN LEVEL 2: Performed by: OBSTETRICS & GYNECOLOGY

## 2021-11-03 PROCEDURE — 160027 HCHG SURGERY MINUTES - 1ST 30 MINS LEVEL 2: Performed by: OBSTETRICS & GYNECOLOGY

## 2021-11-03 PROCEDURE — 700101 HCHG RX REV CODE 250: Performed by: OBSTETRICS & GYNECOLOGY

## 2021-11-03 PROCEDURE — 160035 HCHG PACU - 1ST 60 MINS PHASE I: Performed by: OBSTETRICS & GYNECOLOGY

## 2021-11-03 PROCEDURE — 160036 HCHG PACU - EA ADDL 30 MINS PHASE I: Performed by: OBSTETRICS & GYNECOLOGY

## 2021-11-03 PROCEDURE — 160048 HCHG OR STATISTICAL LEVEL 1-5: Performed by: OBSTETRICS & GYNECOLOGY

## 2021-11-03 PROCEDURE — 700101 HCHG RX REV CODE 250: Performed by: ANESTHESIOLOGY

## 2021-11-03 RX ORDER — ONDANSETRON 2 MG/ML
4 INJECTION INTRAMUSCULAR; INTRAVENOUS
Status: COMPLETED | OUTPATIENT
Start: 2021-11-03 | End: 2021-11-03

## 2021-11-03 RX ORDER — HALOPERIDOL 5 MG/ML
1 INJECTION INTRAMUSCULAR
Status: DISCONTINUED | OUTPATIENT
Start: 2021-11-03 | End: 2021-11-03 | Stop reason: HOSPADM

## 2021-11-03 RX ORDER — SODIUM CHLORIDE, SODIUM LACTATE, POTASSIUM CHLORIDE, CALCIUM CHLORIDE 600; 310; 30; 20 MG/100ML; MG/100ML; MG/100ML; MG/100ML
INJECTION, SOLUTION INTRAVENOUS CONTINUOUS
Status: DISCONTINUED | OUTPATIENT
Start: 2021-11-03 | End: 2021-11-03 | Stop reason: HOSPADM

## 2021-11-03 RX ORDER — PHENYLEPHRINE HCL IN 0.9% NACL 0.5 MG/5ML
SYRINGE (ML) INTRAVENOUS PRN
Status: DISCONTINUED | OUTPATIENT
Start: 2021-11-03 | End: 2021-11-03 | Stop reason: SURG

## 2021-11-03 RX ORDER — DEXMEDETOMIDINE HYDROCHLORIDE 100 UG/ML
INJECTION, SOLUTION INTRAVENOUS PRN
Status: DISCONTINUED | OUTPATIENT
Start: 2021-11-03 | End: 2021-11-03 | Stop reason: SURG

## 2021-11-03 RX ORDER — MORPHINE SULFATE 4 MG/ML
2 INJECTION, SOLUTION INTRAMUSCULAR; INTRAVENOUS
Status: DISCONTINUED | OUTPATIENT
Start: 2021-11-03 | End: 2021-11-03 | Stop reason: HOSPADM

## 2021-11-03 RX ORDER — OXYCODONE HCL 5 MG/5 ML
10 SOLUTION, ORAL ORAL
Status: COMPLETED | OUTPATIENT
Start: 2021-11-03 | End: 2021-11-03

## 2021-11-03 RX ORDER — DIPHENHYDRAMINE HYDROCHLORIDE 50 MG/ML
INJECTION INTRAMUSCULAR; INTRAVENOUS PRN
Status: DISCONTINUED | OUTPATIENT
Start: 2021-11-03 | End: 2021-11-03 | Stop reason: SURG

## 2021-11-03 RX ORDER — MORPHINE SULFATE 10 MG/ML
5 INJECTION, SOLUTION INTRAMUSCULAR; INTRAVENOUS
Status: DISCONTINUED | OUTPATIENT
Start: 2021-11-03 | End: 2021-11-03 | Stop reason: HOSPADM

## 2021-11-03 RX ORDER — ONDANSETRON 2 MG/ML
INJECTION INTRAMUSCULAR; INTRAVENOUS PRN
Status: DISCONTINUED | OUTPATIENT
Start: 2021-11-03 | End: 2021-11-03 | Stop reason: SURG

## 2021-11-03 RX ORDER — METOCLOPRAMIDE HYDROCHLORIDE 5 MG/ML
INJECTION INTRAMUSCULAR; INTRAVENOUS PRN
Status: DISCONTINUED | OUTPATIENT
Start: 2021-11-03 | End: 2021-11-03 | Stop reason: SURG

## 2021-11-03 RX ORDER — DIPHENHYDRAMINE HYDROCHLORIDE 50 MG/ML
12.5 INJECTION INTRAMUSCULAR; INTRAVENOUS
Status: DISCONTINUED | OUTPATIENT
Start: 2021-11-03 | End: 2021-11-03 | Stop reason: HOSPADM

## 2021-11-03 RX ORDER — MIDAZOLAM HYDROCHLORIDE 1 MG/ML
INJECTION INTRAMUSCULAR; INTRAVENOUS PRN
Status: DISCONTINUED | OUTPATIENT
Start: 2021-11-03 | End: 2021-11-03 | Stop reason: SURG

## 2021-11-03 RX ORDER — DEXAMETHASONE SODIUM PHOSPHATE 4 MG/ML
INJECTION, SOLUTION INTRA-ARTICULAR; INTRALESIONAL; INTRAMUSCULAR; INTRAVENOUS; SOFT TISSUE PRN
Status: DISCONTINUED | OUTPATIENT
Start: 2021-11-03 | End: 2021-11-03 | Stop reason: SURG

## 2021-11-03 RX ORDER — OXYCODONE HCL 5 MG/5 ML
5 SOLUTION, ORAL ORAL
Status: COMPLETED | OUTPATIENT
Start: 2021-11-03 | End: 2021-11-03

## 2021-11-03 RX ORDER — KETOROLAC TROMETHAMINE 30 MG/ML
INJECTION, SOLUTION INTRAMUSCULAR; INTRAVENOUS PRN
Status: DISCONTINUED | OUTPATIENT
Start: 2021-11-03 | End: 2021-11-03 | Stop reason: SURG

## 2021-11-03 RX ORDER — ACETAMINOPHEN 500 MG
1000 TABLET ORAL ONCE
Status: COMPLETED | OUTPATIENT
Start: 2021-11-03 | End: 2021-11-03

## 2021-11-03 RX ORDER — MIDAZOLAM HYDROCHLORIDE 1 MG/ML
1 INJECTION INTRAMUSCULAR; INTRAVENOUS
Status: DISCONTINUED | OUTPATIENT
Start: 2021-11-03 | End: 2021-11-03 | Stop reason: HOSPADM

## 2021-11-03 RX ORDER — LIDOCAINE HYDROCHLORIDE 20 MG/ML
INJECTION, SOLUTION EPIDURAL; INFILTRATION; INTRACAUDAL; PERINEURAL PRN
Status: DISCONTINUED | OUTPATIENT
Start: 2021-11-03 | End: 2021-11-03 | Stop reason: SURG

## 2021-11-03 RX ORDER — MORPHINE SULFATE 4 MG/ML
1 INJECTION, SOLUTION INTRAMUSCULAR; INTRAVENOUS
Status: DISCONTINUED | OUTPATIENT
Start: 2021-11-03 | End: 2021-11-03 | Stop reason: HOSPADM

## 2021-11-03 RX ADMIN — OXYCODONE HYDROCHLORIDE 10 MG: 5 SOLUTION ORAL at 08:41

## 2021-11-03 RX ADMIN — ONDANSETRON 4 MG: 2 INJECTION INTRAMUSCULAR; INTRAVENOUS at 08:08

## 2021-11-03 RX ADMIN — DEXAMETHASONE SODIUM PHOSPHATE 8 MG: 4 INJECTION, SOLUTION INTRA-ARTICULAR; INTRALESIONAL; INTRAMUSCULAR; INTRAVENOUS; SOFT TISSUE at 07:50

## 2021-11-03 RX ADMIN — ACETAMINOPHEN 1000 MG: 500 TABLET ORAL at 06:28

## 2021-11-03 RX ADMIN — MIDAZOLAM HYDROCHLORIDE 2 MG: 1 INJECTION, SOLUTION INTRAMUSCULAR; INTRAVENOUS at 07:38

## 2021-11-03 RX ADMIN — DIPHENHYDRAMINE HYDROCHLORIDE 12.5 MG: 50 INJECTION, SOLUTION INTRAMUSCULAR; INTRAVENOUS at 07:50

## 2021-11-03 RX ADMIN — SODIUM CHLORIDE, POTASSIUM CHLORIDE, SODIUM LACTATE AND CALCIUM CHLORIDE: 600; 310; 30; 20 INJECTION, SOLUTION INTRAVENOUS at 06:29

## 2021-11-03 RX ADMIN — Medication 100 MCG: at 07:53

## 2021-11-03 RX ADMIN — PROPOFOL 90 MG: 10 INJECTION, EMULSION INTRAVENOUS at 07:46

## 2021-11-03 RX ADMIN — METOCLOPRAMIDE 8 MG: 5 INJECTION, SOLUTION INTRAMUSCULAR; INTRAVENOUS at 07:50

## 2021-11-03 RX ADMIN — ONDANSETRON 4 MG: 2 INJECTION INTRAMUSCULAR; INTRAVENOUS at 08:41

## 2021-11-03 RX ADMIN — FENTANYL CITRATE 50 MCG: 50 INJECTION, SOLUTION INTRAMUSCULAR; INTRAVENOUS at 07:46

## 2021-11-03 RX ADMIN — KETOROLAC TROMETHAMINE 30 MG: 30 INJECTION, SOLUTION INTRAMUSCULAR at 08:08

## 2021-11-03 RX ADMIN — DEXMEDETOMIDINE 5 MCG: 200 INJECTION, SOLUTION INTRAVENOUS at 08:04

## 2021-11-03 RX ADMIN — LIDOCAINE HYDROCHLORIDE 50 MG: 20 INJECTION, SOLUTION EPIDURAL; INFILTRATION; INTRACAUDAL at 07:46

## 2021-11-03 ASSESSMENT — PAIN SCALES - GENERAL: PAIN_LEVEL: 0

## 2021-11-03 NOTE — ANESTHESIA POSTPROCEDURE EVALUATION
Patient: Eleanor Centeno    Procedure Summary     Date: 11/03/21 Room / Location: [facility] ROOM 25 / SURGERY SAME DAY [facility]    Anesthesia Start: 0739 Anesthesia Stop: 0824    Procedure: INSERTION OR REMOVAL,IUD - REMOVAL (N/A Uterus) Diagnosis: (RETAINED INTRAUTERINE CONTRACEPTIVE DEVICE)    Surgeons: Valeriy Emery M.D. Responsible Provider: Marion Mcdermott M.D.    Anesthesia Type: general ASA Status: 2          Final Anesthesia Type: general  Last vitals  BP   Blood Pressure: (!) 96/63    Temp   36.3 °C (97.4 °F)    Pulse   83   Resp   20    SpO2   98 %      Anesthesia Post Evaluation    Patient location during evaluation: PACU  Patient participation: complete - patient participated  Level of consciousness: awake and alert  Pain score: 0    Airway patency: patent  Anesthetic complications: no  Cardiovascular status: hemodynamically stable  Respiratory status: acceptable  Hydration status: euvolemic    PONV: none          No complications documented.     Nurse Pain Score: 0 (NPRS)

## 2021-11-03 NOTE — DISCHARGE INSTRUCTIONS
ACTIVITY: Rest and take it easy for the first 24 hours.  A responsible adult is recommended to remain with you during that time.  It is normal to feel sleepy.  We encourage you to not do anything that requires balance, judgment or coordination.    MILD FLU-LIKE SYMPTOMS ARE NORMAL. YOU MAY EXPERIENCE GENERALIZED MUSCLE ACHES, THROAT IRRITATION, HEADACHE AND/OR SOME NAUSEA.    FOR 24 HOURS DO NOT:  Drive, operate machinery or run household appliances.  Drink beer or alcoholic beverages.   Make important decisions or sign legal documents.    SPECIAL INSTRUCTIONS:     Change julieta pad as needed.   Heat packs for cramping.  No sex for 1 week.      DIET: To avoid nausea, slowly advance diet as tolerated, avoiding spicy or greasy foods for the first day.  Add more substantial food to your diet according to your physician's instructions.  Babies can be fed formula or breast milk as soon as they are hungry.  INCREASE FLUIDS AND FIBER TO AVOID CONSTIPATION.      FOLLOW-UP APPOINTMENT:  A follow-up appointment should be arranged with your doctor; call to schedule.    You should CALL YOUR PHYSICIAN if you develop:  Fever greater than 101 degrees F.  Pain not relieved by medication, or persistent nausea or vomiting.  Excessive bleeding (blood soaking through dressing) or unexpected drainage from the wound.  Extreme redness or swelling around the incision site, drainage of pus or foul smelling drainage.  Inability to urinate or empty your bladder within 8 hours.  Problems with breathing or chest pain.    You should call 911 if you develop problems with breathing or chest pain.  If you are unable to contact your doctor or surgical center, you should go to the nearest emergency room or urgent care center.      Physician's telephone #: [phone]    If any questions arise, call your doctor.  If your doctor is not available, please feel free to call the Surgical Center at [phone]. The Contact Center is open Monday through  Friday 7AM to 5PM and may speak to a nurse at [phone], or toll free at [phone].     A registered nurse may call you a few days after your surgery to see how you are doing after your procedure.    MEDICATIONS: Resume taking daily medication.  Take prescribed pain medication with food.  If no medication is prescribed, you may take non-aspirin pain medication if needed.  PAIN MEDICATION CAN BE VERY CONSTIPATING.  Take a stool softener or laxative such as senokot, pericolace, or milk of magnesia if needed.    Prescription given for ***.  Last pain medication given at 8:41 am.    If your physician has prescribed pain medication that includes Acetaminophen (Tylenol), do not take additional Acetaminophen (Tylenol) while taking the prescribed medication.    Depression / Suicide Risk    As you are discharged from this [facility] facility, it is important to learn how to keep safe from harming yourself.    Recognize the warning signs:  · Abrupt changes in personality, positive or negative- including increase in energy   · Giving away possessions  · Change in eating patterns- significant weight changes-  positive or negative  · Change in sleeping patterns- unable to sleep or sleeping all the time   · Unwillingness or inability to communicate  · Depression  · Unusual sadness, discouragement and loneliness  · Talk of wanting to die  · Neglect of personal appearance   · Rebelliousness- reckless behavior  · Withdrawal from people/activities they love  · Confusion- inability to concentrate     If you or a loved one observes any of these behaviors or has concerns about self-harm, here's what you can do:  · Talk about it- your feelings and reasons for harming yourself  · Remove any means that you might use to hurt yourself (examples: pills, rope, extension cords, firearm)  · Get professional help from the community (Mental Health, Substance Abuse, psychological counseling)  · Do not be alone:Call your Safe Contact-  someone whom you trust who will be there for you.  · Call your local CRISIS HOTLINE 841-3447 or 650-637-6469  · Call your local Children's Mobile Crisis Response Team [place] (852) 258-3232 or www.[employer]  · Call the toll free National Suicide Prevention Hotlines   · National Suicide Prevention Lifeline 650-024-NTQC (0351)  · [employer] Line Network 800-SUICIDE (873-4698)

## 2021-11-03 NOTE — OP REPORT
DATE OF SERVICE: November 3, 2021    PREOPERATIVE DIAGNOSIS: Retained IUD    POSTOPERATIVE DIAGNOSIS:  same    PROCEDURE PERFORMED: Removal of retained IUD    SURGEON:  Valeriy Emery MD.    ASSISTANT:  none    ANESTHESIA:  General endotracheal anesthesia.    ANESTHESIOLOGIST:  HEATH Mcdermott MD    ESTIMATED BLOOD LOSS:  3 mL      FINDINGS: Normal cervix, 7 cm uterus in mid position.  No adnexal masses    COMPLICATIONS:  None.    PROCEDURE:  After appropriate consents were obtained, the patient was taken to the operating room where general endotracheal anesthesia was applied without difficulty.  She was prepped and draped in the usual sterile fashion. The patient was placed in the dorsal lithotomy position with Shekhar stirrups. A metal bivalve speculum was placed into the vagina. The cervix was grasped with a single tooth tenaculum.     The cervix was then dilated with the help of Hegar dilators.  Dilated to approximately 5 mm.  Polyp forceps were then introduced into the uterus.  The IUD was grasped and removed in its entirety.    The tenaculum was removed and there was noted to be some bleeding from attachment sites.  Figure-of-eight suture with 0 Vicryl was then placed.  Excellent hemostasis was seen.    Procedure was then terminated at this time         ____________________________________    Valeriy Emery MD

## 2021-11-03 NOTE — OR SURGEON
Immediate Post OP Note    PreOp Diagnosis: Retained IUD      PostOp Diagnosis: Same      Procedure(s):  INSERTION OR REMOVAL,IUD - REMOVAL - Wound Class: Clean Contaminated    Surgeon(s):  Valeriy Emery M.D.    Anesthesiologist/Type of Anesthesia:  Anesthesiologist: Marion Mcdermott M.D./General    Surgical Staff:  Circulator: Terell Washington R.N.  Scrub Person: Leilani Rubio; Kellen Zambrano    Specimens removed if any:  IUD    Estimated Blood Loss: 3 mL    Findings: Normal cervix.  Approximately 7 cm, midposition uterus.  No adnexal masses    Complications: None        11/3/2021 9:10 AM Valeriy Emery M.D.

## 2021-11-03 NOTE — ANESTHESIA TIME REPORT
Anesthesia Start and Stop Event Times     Date Time Event    11/3/2021 0704 Ready for Procedure     0739 Anesthesia Start     0824 Anesthesia Stop        Responsible Staff  11/03/21    Name Role Begin End    Marion Mcdermott M.D. Anesth 0739 0824        Preop Diagnosis (Free Text):  Pre-op Diagnosis     RETAINED INTRAUTERINE CONTRACEPTIVE DEVICE        Preop Diagnosis (Codes):    Premium Reason  Non-Premium    Comments:

## 2021-11-03 NOTE — ANESTHESIA PROCEDURE NOTES
Airway    Date/Time: 11/3/2021 7:46 AM  Performed by: Marion Mcdermott M.D.  Authorized by: Marion Mcdermott M.D.     Location:  OR  Urgency:  Elective  Indications for Airway Management:  Anesthesia      Spontaneous Ventilation: absent    Sedation Level:  Deep  Preoxygenated: Yes    Mask Difficulty Assessment:  0 - not attempted  Final Airway Type:  Supraglottic airway  Final Supraglottic Airway:  Standard LMA    SGA Size:  3  Number of Attempts at Approach:  1

## 2021-11-03 NOTE — OR NURSING
0822: Patient from OR sleeping via gurney to PACU. 6 L via mask. Whit pad dry. Report from the anesthesiologist and RN received.     0828: Warm blankets for comfort.     0841: Patient tolerated sips of water. Oxy 10 mg PO given.    0853: Marciano  updated.    0905: Patient sleeping on and off. Denies pain or nausea.    0935: MD at bedside to update patient. Discharge instructions discussed with  Marciano. All questions answered and verbalizes understanding. DC instructions signed and with .     0944: Whit pad dry.  No c/o pain or nausea. Patient wide awake. VSS. Discussed diet, activity, medications, follow up care and worsening symptoms. Patient met criteria for discharge.

## 2021-11-24 ENCOUNTER — GYNECOLOGY VISIT (OUTPATIENT)
Dept: OBGYN | Facility: CLINIC | Age: 26
End: 2021-11-24
Payer: MEDICAID

## 2021-11-24 VITALS — WEIGHT: 101 LBS | SYSTOLIC BLOOD PRESSURE: 100 MMHG | BODY MASS INDEX: 19.08 KG/M2 | DIASTOLIC BLOOD PRESSURE: 60 MMHG

## 2021-11-24 DIAGNOSIS — Z48.89 POSTOPERATIVE VISIT: ICD-10-CM

## 2021-11-24 PROCEDURE — 99024 POSTOP FOLLOW-UP VISIT: CPT | Performed by: OBSTETRICS & GYNECOLOGY

## 2021-11-24 NOTE — NON-PROVIDER
Pt here annual exam  Pt states no complaints as of today   Good#[phone]   Pharmacy verified

## 2021-11-24 NOTE — PROGRESS NOTES
Chief complaint: Postoperative visit    SUBJECTIVE:  Eleanor Centeno presents to the clinic 3 weeks following hysteroscopy/D&C, removal of retained IUD    Eating a regular diet without difficulty.   Bowel movement are Normal.    The patient is not having any pain.   .   Patient Denies Incisional pain, drainage or redness    OBJECTIVE:  /60 (BP Location: Right arm, Patient Position: Sitting, BP Cuff Size: Adult)   Wt 45.8 kg (101 lb)   LMP  (LMP Unknown)   BMI 19.08 kg/m²   Current Outpatient Medications on File Prior to Visit   Medication Sig Dispense Refill   • Multiple Vitamin (MULTI-VITAMIN DAILY PO) Take  by mouth every day.       No current facility-administered medications on file prior to visit.       Constitutional:  alert, healthy, no distress.  Abdomen:  soft, bowel sounds active, non-tender, non-distended.  Incision:  healing well, no drainage, no erythema, no hernia, no seroma, no swelling, no dehiscence, incision well approximated.    IMPRESSION: Doing well postoperatively.  Pt is to increase activities as tolerated.    Lab:   Recent Results (from the past 1008 hour(s))   SARS-CoV-2 PCR (24 hour In-House): Collect NP swab in VTM    Collection Time: 10/29/21 12:18 PM    Specimen: Respirate   Result Value Ref Range    SARS-CoV-2 Source Nasal Swab     SARS-CoV-2 by PCR NotDetected    COVID/SARS CoV-2 PCR    Collection Time: 10/29/21 12:18 PM   Result Value Ref Range    COVID Order Status Received    HCG Qualitative Ur    Collection Time: 11/03/21  6:00 AM   Result Value Ref Range    Beta-Hcg Urine Negative Negative       PLAN:  Continue any current medications.  (See Med List for details.)  Return to clinic: as needed    Patient considering conception at this time.  We will begin prenatal vitamins.    All questions answered

## 2022-05-25 ENCOUNTER — GYNECOLOGY VISIT (OUTPATIENT)
Dept: OBGYN | Facility: CLINIC | Age: 27
End: 2022-05-25
Payer: MEDICAID

## 2022-05-25 VITALS — BODY MASS INDEX: 19.84 KG/M2 | SYSTOLIC BLOOD PRESSURE: 104 MMHG | WEIGHT: 105 LBS | DIASTOLIC BLOOD PRESSURE: 60 MMHG

## 2022-05-25 DIAGNOSIS — N64.4 MASTODYNIA OF RIGHT BREAST: ICD-10-CM

## 2022-05-25 DIAGNOSIS — N64.52 NIPPLE DISCHARGE: ICD-10-CM

## 2022-05-25 PROBLEM — T83.39XA RETAINED INTRAUTERINE CONTRACEPTIVE DEVICE (IUD): Status: RESOLVED | Noted: 2021-10-14 | Resolved: 2022-05-25

## 2022-05-25 PROBLEM — Z30.432 ENCOUNTER FOR IUD REMOVAL: Status: RESOLVED | Noted: 2017-05-02 | Resolved: 2022-05-25

## 2022-05-25 PROCEDURE — 99214 OFFICE O/P EST MOD 30 MIN: CPT | Performed by: ADVANCED PRACTICE MIDWIFE

## 2022-05-25 NOTE — PROGRESS NOTES
"Eleanor Centeno is a 26 y.o. female who presents for breast         HPI Comments: Pt presents for right breast pain.  Patient's last menstrual period was 04/28/2022 (exact date).   Patient reports that she noticed right breast discharge that started in march 2022 (2 months ago). She reports that it was white in color. She reports that she then developed \"bump\" near the nipple that she subsequently \"popped.\" After popping this area she developed blood and purulent drainage that lasted for two weeks.   She reports that left breast has felt hard and tender similar to engorgement with breast feeding.     Patient currently at desired weight but generally fluctuate between  lbs which she considers underweight.     She does report positive family history of breast cancer in her maternal aunt. She believes that her aunt was young and had double mastectomy and subsequent breast reconstruction/ implants. She is unsure of genetic testing. Aunt was <43 years old at time of diagnosis and treatment. Mom with history of cervical cancer 25 years ago and subsequent hysterectomy but unsure of timeline with this.      Review of Systems   Pertinent positives documented in HPI and all other systems reviewed & are negative      Past Medical History:   Diagnosis Date   • Anxiety    • Asthma     \"sports asthma\"   • Back pain    • Chronic back pain     related to scolosis   • Depression    • Migraine    • Spina bifida occulta        Medications:   Current Outpatient Medications Ordered in Epic   Medication Sig Dispense Refill   • Multiple Vitamin (MULTI-VITAMIN DAILY PO) Take  by mouth every day. (Patient not taking: Reported on 5/25/2022)       No current [facility]-ordered facility-administered medications on file.          Objective:   Vital measurements:  /60   Wt 47.6 kg (105 lb)   Body mass index is 19.84 kg/m². (Goal BM I>18 <25)    Physical Exam   Nursing note and vitals reviewed.  Constitutional: She is oriented to person, " place, and time. She appears well-developed and well-nourished. No distress.     Abdominal: Soft. Bowel sounds are normal. She exhibits no distension and no mass. No tenderness. She has no rebound and no guarding.     Breasts:  L=R, no dimpling, no retractions, no scabbing, supple; fibrocystic breasts bilaterally. Increased density on right breast at 12 oclock position.     Musculoskeletal: Normal range of motion. She exhibits no edema and no tenderness.     Lymphadenopathy: She has no cervical adenopathy.     Skin: Skin is warm and dry. No rash noted. She is not diaphoretic. No erythema. No pallor.     Psychiatric: She has a normal mood and affect. Her behavior is normal. Judgment and thought content normal.            Assessment:     1. Nipple discharge  HCG QUANTITATIVE    PROLACTIN    TSH    US-BREAST LIMITED-RIGHT    17-OH PROGESTERONE   2. Mastodynia of right breast  HCG QUANTITATIVE    PROLACTIN    TSH    US-BREAST LIMITED-RIGHT    17-OH PROGESTERONE       Plan:   1. Discussed with patient that we will complete labs including hcg. Differentials at this time include pregnancy, previous nipple piercing tract infection, or more rarely pituitary tumor. Imaging ordered today if no answers gained from labs.   2. Follow up PRN.

## 2022-05-25 NOTE — NON-PROVIDER
Pt here to discuss discharge from right breast.     Pt states she is having pus and blood coming from her right breast, pt states it is painful, and itchy. Tiesha she noticed all of this in March  Good# [phone]  LMP: 4/28/22  Pharmacy confirmed.

## 2022-06-01 ENCOUNTER — HOSPITAL ENCOUNTER (OUTPATIENT)
Dept: LAB | Facility: MEDICAL CENTER | Age: 27
End: 2022-06-01
Attending: ADVANCED PRACTICE MIDWIFE
Payer: MEDICAID

## 2022-06-01 DIAGNOSIS — N64.4 MASTODYNIA OF RIGHT BREAST: ICD-10-CM

## 2022-06-01 DIAGNOSIS — N64.52 NIPPLE DISCHARGE: ICD-10-CM

## 2022-06-01 LAB
B-HCG SERPL-ACNC: <1 MIU/ML (ref 0–5)
PROLACTIN SERPL-MCNC: 3.86 NG/ML (ref 2.8–26)
TSH SERPL DL<=0.005 MIU/L-ACNC: 1.62 UIU/ML (ref 0.38–5.33)

## 2022-06-01 PROCEDURE — 36415 COLL VENOUS BLD VENIPUNCTURE: CPT

## 2022-06-01 PROCEDURE — 84443 ASSAY THYROID STIM HORMONE: CPT

## 2022-06-01 PROCEDURE — 84702 CHORIONIC GONADOTROPIN TEST: CPT

## 2022-06-01 PROCEDURE — 84146 ASSAY OF PROLACTIN: CPT

## 2022-06-01 PROCEDURE — 83498 ASY HYDROXYPROGESTERONE 17-D: CPT

## 2022-06-04 LAB — 17OHP SERPL-MCNC: 26.13 NG/DL

## 2022-08-31 ENCOUNTER — GYNECOLOGY VISIT (OUTPATIENT)
Dept: OBGYN | Facility: CLINIC | Age: 27
End: 2022-08-31
Payer: MEDICAID

## 2022-08-31 VITALS
BODY MASS INDEX: 20.6 KG/M2 | DIASTOLIC BLOOD PRESSURE: 54 MMHG | WEIGHT: 109.1 LBS | SYSTOLIC BLOOD PRESSURE: 96 MMHG | HEIGHT: 61 IN

## 2022-08-31 DIAGNOSIS — N93.8 DUB (DYSFUNCTIONAL UTERINE BLEEDING): ICD-10-CM

## 2022-08-31 LAB
INT CON NEG: NEGATIVE
INT CON POS: POSITIVE
POC URINE PREGNANCY TEST: NORMAL

## 2022-08-31 PROCEDURE — 81025 URINE PREGNANCY TEST: CPT | Performed by: OBSTETRICS & GYNECOLOGY

## 2022-08-31 PROCEDURE — 99214 OFFICE O/P EST MOD 30 MIN: CPT | Performed by: OBSTETRICS & GYNECOLOGY

## 2022-08-31 RX ORDER — ASPIRIN 81 MG/1
81 TABLET, CHEWABLE ORAL DAILY
Qty: 100 TABLET | Refills: 3 | Status: SHIPPED | OUTPATIENT
Start: 2022-08-31 | End: 2023-03-23

## 2022-08-31 NOTE — PROGRESS NOTES
Confirmation of Pregnancy  LMP:6/24/2022  GA:9w5d  TRES:3/31/2023  UPT POSITIVE, done in clinic.  Confirmed good ph#, pharmacy, drug allergy, and medications on file.  Pt states no other complaints for today.  On PNV  Pt states has been having N/V for qd.

## 2022-08-31 NOTE — PROGRESS NOTES
"CC: Missed menses    Eleanor Centeno is a 26 y.o.  who presents presents due to missed menses. LMP 22- exact date.  She was not using anything for birthcontrol.  This is a  planned and desired pregnancy.   Reports she has been feeling good thus far in pregnancy. She reports nausea/vomiting, reports headache, denies dysuria, denies  vaginal bleeding/spotting, and denies contractions/cramping.    Partner: Marciano    Review of systems:  Pertinent positives documented in HPI and all other systems reviewed & are negative    GYN History:  No LMP recorded. Menses regular. Last pap ,  no h/o abnormal pap.  No history of cone biopsy, LEEP or any other cervical, uterine or gynecologic surgery. No history of sexually transmitted diseases.  Currently sexually active with her partner.      OB History:    OB History    Para Term  AB Living   3 2 2   1 2   SAB IAB Ectopic Molar Multiple Live Births   1         2      # Outcome Date GA Lbr Sami/2nd Weight Sex Delivery Anes PTL Lv   3 Term 17 39w0d  2.863 kg (6 lb 5 oz) F Vag-Spont  N RADHA   2 Term 10/01/15 39w0d  2.693 kg (5 lb 15 oz) F Vag-Spont  Y RADHA   1 SAB                All PMH, PSH, allergies, social history and FH reviewed and updated today:  Past Medical History:  Past Medical History:   Diagnosis Date    Anxiety     Asthma     \"sports asthma\"    Back pain     Chronic back pain     related to scolosis    Depression     Migraine     Spina bifida occulta        Past Surgical History:  Past Surgical History:   Procedure Laterality Date    INSERTION OR REMOVAL, IUD N/A 2021    Procedure: INSERTION OR REMOVAL,IUD - REMOVAL;  Surgeon: Valeriy Emery M.D.;  Location: SURGERY SAME DAY [facility];  Service: Obstetrics    DENTAL EXTRACTION(S)      [place] teeth    DE REMOVAL OF TONSILS,<13 Y/O         Medications:   Current Outpatient Medications Ordered in Epic   Medication Sig Dispense Refill    Prenatal MV-Min-Fe Fum-FA-DHA (PRENATAL 1 PO) " "Take  by mouth.       No current Epic-ordered facility-administered medications on file.       Allergies: Tape    Social History:  Social History     Socioeconomic History    Marital status:    Tobacco Use    Smoking status: Some Days     Packs/day: 0.50     Types: Cigarettes    Smokeless tobacco: Never    Tobacco comments:     Less than a pack a day.    Vaping Use    Vaping Use: Never used   Substance and Sexual Activity    Alcohol use: No    Drug use: No    Sexual activity: Yes     Partners: Male     Comment: Planned pregnancy.       Family History:  Family History   Problem Relation Age of Onset    Arthritis Mother     Lung Disease Mother     Cancer Mother         cervical ca    Psychiatric Illness Mother     Hypertension Mother     Alcohol/Drug Mother     Ovarian Cancer Mother     Psychiatric Illness Sister         schizophernia    Alcohol/Drug Sister     Seizures Sister     Cancer Maternal Aunt     Psychiatric Illness Maternal Aunt            Objective:   Vitals:  BP (!) 96/54 (BP Location: Left arm, Patient Position: Sitting, BP Cuff Size: Adult)   Ht 1.549 m (5' 1\")   Wt 49.5 kg (109 lb 1.6 oz)   Body mass index is 20.61 kg/m². (Goal BM I>18 <25)  Exam:  General: appears stated age, is in no apparent distress, is well developed and well nourished  Head: normocephalic, non-tender  Neck: neck is supple, no jugular venous distension  Abdomen: Bowel sounds positive, nondistended, soft, nontender x4, no rebound or guarding. No organomegaly. No masses.   Female GYN: normal female external genitalia without lesions  Skin: No rashes, or ulcers or lesions seen  Psychiatric: appropriate affect, alert and oriented x3, intact judgment and insight.    Procedure:  Transvaginal US performed by me and per my read:    Indication: amenorrhea.     Findings: patel intrauterine pregnancy @ 8w4d by CRL. Positive yolk sac. Positive fetal cardiac activity. Right ovary not seen. Left Ovary not seen. Cervical length " grossly normal. No free fluid in the cul-de-sac.    Impression: viable IUP @ 8w4d.  TRES by US of 23.    Recent Results (from the past 336 hour(s))   POCT Pregnancy    Collection Time: 22  1:05 PM   Result Value Ref Range    POC Urine Pregnancy Test POS Negative    Internal Control Positive Positive     Internal Control Negative Negative       Pregnancy exam/test positive    A/P:   26 y.o.  here for   1. DUB (dysfunctional uterine bleeding)  POCT Pregnancy          #Missed menses - amenorrhea due to pregnancy.  US today is not c/w LMP. TRES of 23.  Discussed pregnancy with patient who is happy.    --Normal pregnancy s/s discussed  --Advised prenatal vitamins, healthy well rounded diet, adequate hydration, and continued exercise.    #Cervical cancer screening.  Last pap .    #Will order prenatal labs and any additional imaging/testing needed at new OB visit  #SAB precautions discussed.  #Discussed the size of our practice and that she will see multiple providers during the course of her care. She expresses understanding.   # first baby had cardiac anomaly- recommend MFM referral at [facility] appt. Also hx of IUGR with previous pregnancies- recommended daily ASA starting at 12 weeks.   # smoking- recommended cessation.  #Follow up in 2-4 weeks for new OB visit.    30 minutes were spent in face-to-face patient contact with patient, greater than 50% of which was was spent in counseling and coordination of care for her newly diagnosed pregnancy including medical and surgical options of care.    [facility]

## 2022-09-28 ENCOUNTER — HOSPITAL ENCOUNTER (OUTPATIENT)
Facility: MEDICAL CENTER | Age: 27
End: 2022-09-28
Attending: PHYSICIAN ASSISTANT
Payer: MEDICAID

## 2022-09-28 ENCOUNTER — APPOINTMENT (OUTPATIENT)
Dept: OBGYN | Facility: CLINIC | Age: 27
End: 2022-09-28
Payer: MEDICAID

## 2022-09-28 ENCOUNTER — INITIAL PRENATAL (OUTPATIENT)
Dept: OBGYN | Facility: CLINIC | Age: 27
End: 2022-09-28
Payer: MEDICAID

## 2022-09-28 VITALS
DIASTOLIC BLOOD PRESSURE: 60 MMHG | HEIGHT: 61 IN | SYSTOLIC BLOOD PRESSURE: 110 MMHG | WEIGHT: 108.6 LBS | BODY MASS INDEX: 20.5 KG/M2

## 2022-09-28 DIAGNOSIS — O26.891 RH NEGATIVE STATE IN ANTEPARTUM PERIOD, FIRST TRIMESTER: ICD-10-CM

## 2022-09-28 DIAGNOSIS — Z34.81 ENCOUNTER FOR SUPERVISION OF OTHER NORMAL PREGNANCY IN FIRST TRIMESTER: ICD-10-CM

## 2022-09-28 DIAGNOSIS — Z67.91 RH NEGATIVE STATE IN ANTEPARTUM PERIOD, FIRST TRIMESTER: ICD-10-CM

## 2022-09-28 PROBLEM — R42 DIZZINESS: Status: RESOLVED | Noted: 2021-07-07 | Resolved: 2022-09-28

## 2022-09-28 PROBLEM — Z48.89 POSTOPERATIVE VISIT: Status: RESOLVED | Noted: 2021-11-24 | Resolved: 2022-09-28

## 2022-09-28 PROBLEM — Z34.91 SUPERVISION OF NORMAL PREGNANCY IN FIRST TRIMESTER: Status: ACTIVE | Noted: 2022-09-28

## 2022-09-28 PROBLEM — R63.4 UNINTENDED WEIGHT LOSS: Status: RESOLVED | Noted: 2019-02-13 | Resolved: 2022-09-28

## 2022-09-28 LAB
C TRACH DNA GENITAL QL NAA+PROBE: NEGATIVE
N GONORRHOEA DNA GENITAL QL NAA+PROBE: NEGATIVE
SPECIMEN SOURCE: NORMAL

## 2022-09-28 PROCEDURE — 87591 N.GONORRHOEAE DNA AMP PROB: CPT

## 2022-09-28 PROCEDURE — 0500F INITIAL PRENATAL CARE VISIT: CPT | Performed by: PHYSICIAN ASSISTANT

## 2022-09-28 PROCEDURE — 87491 CHLMYD TRACH DNA AMP PROBE: CPT

## 2022-09-28 ASSESSMENT — ENCOUNTER SYMPTOMS
PSYCHIATRIC NEGATIVE: 1
RESPIRATORY NEGATIVE: 1
NAUSEA: 1
NEUROLOGICAL NEGATIVE: 1
EYES NEGATIVE: 1
CARDIOVASCULAR NEGATIVE: 1
CONSTITUTIONAL NEGATIVE: 1
VOMITING: 1
MUSCULOSKELETAL NEGATIVE: 1

## 2022-09-28 NOTE — PROGRESS NOTES
"Subjective     Eleanor Centeno is a 26 y.o. female who presents with New ob visit. Pt is somewhat sure of LMP. Dating is by 8 wk US.   OBHx: 1 SAB and 2 term . Pt denies GDM, PIH or delivery complications, but pt had previa and IUGR with first and some IUGR with 2nd. Same FOB as last pregnancy.   PMHx: Denies  SHX: T&A, dental procedures and anesthesia for IUD removal - no complications  Allergies: Tape only  Social: Denies etoh or drug use but cutting back tob use - down from 1.5ppd to 7-10 cig/day  Meds; Taking PNV and ASA  Pt currently denies cramping, bleeding or pain thpough pt has N/V. No FM.             HPI    Review of Systems   Constitutional: Negative.    HENT: Negative.     Eyes: Negative.    Respiratory: Negative.     Cardiovascular: Negative.    Gastrointestinal:  Positive for nausea and vomiting.   Genitourinary: Negative.    Musculoskeletal: Negative.    Skin: Negative.    Neurological: Negative.    Endo/Heme/Allergies: Negative.    Psychiatric/Behavioral: Negative.     All other systems reviewed and are negative.           Objective     /60   Ht 1.549 m (5' 1\")   Wt 49.3 kg (108 lb 9.6 oz)   LMP 2022 (Exact Date)   BMI 20.52 kg/m²      Physical Exam  Vitals reviewed.   Constitutional:       Appearance: She is well-developed.   HENT:      Head: Normocephalic and atraumatic.   Eyes:      Pupils: Pupils are equal, round, and reactive to light.   Neck:      Thyroid: No thyromegaly.   Cardiovascular:      Rate and Rhythm: Normal rate and regular rhythm.      Heart sounds: Normal heart sounds.   Pulmonary:      Effort: Pulmonary effort is normal. No respiratory distress.      Breath sounds: Normal breath sounds.   Abdominal:      General: Bowel sounds are normal. There is no distension.      Palpations: Abdomen is soft.      Tenderness: There is no abdominal tenderness.   Genitourinary:     Exam position: Supine.      Labia:         Right: No rash or tenderness.         Left: No rash " or tenderness.       Vagina: Normal. No signs of injury and foreign body. No vaginal discharge or erythema.      Cervix: No cervical motion tenderness.      Uterus: Enlarged (Gravid, uterus c/w 12 wk size). Not deviated and not tender.       Adnexa:         Right: No mass or tenderness.          Left: No mass or tenderness.     Musculoskeletal:      Cervical back: Normal range of motion and neck supple.   Skin:     General: Skin is warm and dry.      Findings: No erythema.   Neurological:      Mental Status: She is alert.      Deep Tendon Reflexes: Reflexes are normal and symmetric.   Psychiatric:         Behavior: Behavior normal.         Thought Content: Thought content normal.                           Assessment & Plan        1. Encounter for supervision of other normal pregnancy in first trimester  - F/u 4 wk, US 8 wk  - Chlamydia/GC, PCR (Urine); Future  - HEP C VIRUS ANTIBODY; Future  - PREG CNTR PRENATAL PN; Future  - URINE CULTURE(NEW); Future  - US-OB 2ND 3RD TRI COMPLETE; Future  - URINE DRUG SCREEN W/CONF (AR); Future    2. Rh negative state in antepartum period, first trimester  - Rhogam at 28 wk

## 2022-09-28 NOTE — PROGRESS NOTES
NOB today  LMP: 06/24/2022   Last pap:02/26/2022 WNL   Phone # [phone]  Pharmacy confirmed  On PNV Yes  Cystic Fibrosis test offered. Declined   Flu vaccine Declined   c/o  Morning Sickness  Fatigue

## 2022-10-13 ENCOUNTER — HOSPITAL ENCOUNTER (OUTPATIENT)
Dept: LAB | Facility: MEDICAL CENTER | Age: 27
End: 2022-10-13
Attending: PHYSICIAN ASSISTANT
Payer: MEDICAID

## 2022-10-13 DIAGNOSIS — Z34.81 ENCOUNTER FOR SUPERVISION OF OTHER NORMAL PREGNANCY IN FIRST TRIMESTER: ICD-10-CM

## 2022-10-13 LAB
APPEARANCE UR: CLEAR
BASOPHILS # BLD AUTO: 0.3 % (ref 0–1.8)
BASOPHILS # BLD: 0.02 K/UL (ref 0–0.12)
BILIRUB UR QL STRIP.AUTO: NEGATIVE
COLOR UR: YELLOW
EOSINOPHIL # BLD AUTO: 0.06 K/UL (ref 0–0.51)
EOSINOPHIL NFR BLD: 0.8 % (ref 0–6.9)
ERYTHROCYTE [DISTWIDTH] IN BLOOD BY AUTOMATED COUNT: 43.8 FL (ref 35.9–50)
GLUCOSE UR STRIP.AUTO-MCNC: NEGATIVE MG/DL
HBV SURFACE AG SER QL: ABNORMAL
HCT VFR BLD AUTO: 38.8 % (ref 37–47)
HCV AB SER QL: ABNORMAL
HCV AB SER QL: NORMAL
HGB BLD-MCNC: 13.2 G/DL (ref 12–16)
HIV 1+2 AB+HIV1 P24 AG SERPL QL IA: NORMAL
IMM GRANULOCYTES # BLD AUTO: 0.03 K/UL (ref 0–0.11)
IMM GRANULOCYTES NFR BLD AUTO: 0.4 % (ref 0–0.9)
KETONES UR STRIP.AUTO-MCNC: NEGATIVE MG/DL
LEUKOCYTE ESTERASE UR QL STRIP.AUTO: NEGATIVE
LYMPHOCYTES # BLD AUTO: 1.38 K/UL (ref 1–4.8)
LYMPHOCYTES NFR BLD: 17.8 % (ref 22–41)
MCH RBC QN AUTO: 32.4 PG (ref 27–33)
MCHC RBC AUTO-ENTMCNC: 34 G/DL (ref 33.6–35)
MCV RBC AUTO: 95.3 FL (ref 81.4–97.8)
MICRO URNS: ABNORMAL
MONOCYTES # BLD AUTO: 0.38 K/UL (ref 0–0.85)
MONOCYTES NFR BLD AUTO: 4.9 % (ref 0–13.4)
NEUTROPHILS # BLD AUTO: 5.9 K/UL (ref 2–7.15)
NEUTROPHILS NFR BLD: 75.8 % (ref 44–72)
NITRITE UR QL STRIP.AUTO: NEGATIVE
NRBC # BLD AUTO: 0 K/UL
NRBC BLD-RTO: 0 /100 WBC
PH UR STRIP.AUTO: 7 [PH] (ref 5–8)
PLATELET # BLD AUTO: 167 K/UL (ref 164–446)
PMV BLD AUTO: 12.7 FL (ref 9–12.9)
PROT UR QL STRIP: NEGATIVE MG/DL
RBC # BLD AUTO: 4.07 M/UL (ref 4.2–5.4)
RBC UR QL AUTO: NEGATIVE
RUBV AB SER QL: 297 IU/ML
SP GR UR STRIP.AUTO: 1.01
T PALLIDUM AB SER QL IA: ABNORMAL
UROBILINOGEN UR STRIP.AUTO-MCNC: 0.2 MG/DL
WBC # BLD AUTO: 7.8 K/UL (ref 4.8–10.8)

## 2022-10-13 PROCEDURE — 86592 SYPHILIS TEST NON-TREP QUAL: CPT

## 2022-10-13 PROCEDURE — 85025 COMPLETE CBC W/AUTO DIFF WBC: CPT

## 2022-10-13 PROCEDURE — 80307 DRUG TEST PRSMV CHEM ANLYZR: CPT

## 2022-10-13 PROCEDURE — 87340 HEPATITIS B SURFACE AG IA: CPT

## 2022-10-13 PROCEDURE — 87389 HIV-1 AG W/HIV-1&-2 AB AG IA: CPT

## 2022-10-13 PROCEDURE — 86901 BLOOD TYPING SEROLOGIC RH(D): CPT

## 2022-10-13 PROCEDURE — 86850 RBC ANTIBODY SCREEN: CPT

## 2022-10-13 PROCEDURE — 86762 RUBELLA ANTIBODY: CPT

## 2022-10-13 PROCEDURE — 87086 URINE CULTURE/COLONY COUNT: CPT

## 2022-10-13 PROCEDURE — 86900 BLOOD TYPING SEROLOGIC ABO: CPT

## 2022-10-13 PROCEDURE — 81003 URINALYSIS AUTO W/O SCOPE: CPT | Mod: XU

## 2022-10-13 PROCEDURE — 86780 TREPONEMA PALLIDUM: CPT

## 2022-10-13 PROCEDURE — 86803 HEPATITIS C AB TEST: CPT | Mod: 91

## 2022-10-13 PROCEDURE — 36415 COLL VENOUS BLD VENIPUNCTURE: CPT

## 2022-10-14 LAB
ABO GROUP BLD: NORMAL
BLD GP AB SCN SERPL QL: NORMAL
RH BLD: NORMAL

## 2022-10-15 LAB
AMPHET CTO UR CFM-MCNC: NEGATIVE NG/ML
BACTERIA UR CULT: NORMAL
BARBITURATES CTO UR CFM-MCNC: NEGATIVE NG/ML
BENZODIAZ CTO UR CFM-MCNC: NEGATIVE NG/ML
CANNABINOIDS CTO UR CFM-MCNC: NEGATIVE NG/ML
COCAINE CTO UR CFM-MCNC: NEGATIVE NG/ML
DRUG COMMENT 753798: NORMAL
METHADONE CTO UR CFM-MCNC: NEGATIVE NG/ML
OPIATES CTO UR CFM-MCNC: NEGATIVE NG/ML
PCP CTO UR CFM-MCNC: NEGATIVE NG/ML
PROPOXYPH CTO UR CFM-MCNC: NEGATIVE NG/ML
SIGNIFICANT IND 70042: NORMAL
SITE SITE: NORMAL
SOURCE SOURCE: NORMAL

## 2022-10-25 ENCOUNTER — ROUTINE PRENATAL (OUTPATIENT)
Dept: OBGYN | Facility: CLINIC | Age: 27
End: 2022-10-25
Payer: MEDICAID

## 2022-10-25 VITALS — DIASTOLIC BLOOD PRESSURE: 58 MMHG | WEIGHT: 111.8 LBS | BODY MASS INDEX: 21.12 KG/M2 | SYSTOLIC BLOOD PRESSURE: 100 MMHG

## 2022-10-25 DIAGNOSIS — Z34.81 ENCOUNTER FOR SUPERVISION OF OTHER NORMAL PREGNANCY IN FIRST TRIMESTER: ICD-10-CM

## 2022-10-25 DIAGNOSIS — Z34.82 ENCOUNTER FOR SUPERVISION OF OTHER NORMAL PREGNANCY IN SECOND TRIMESTER: Primary | ICD-10-CM

## 2022-10-25 PROBLEM — Z34.90 SUPERVISION OF NORMAL PREGNANCY: Status: ACTIVE | Noted: 2022-09-28

## 2022-10-25 PROCEDURE — 0502F SUBSEQUENT PRENATAL CARE: CPT | Performed by: PHYSICIAN ASSISTANT

## 2022-10-25 NOTE — PROGRESS NOTES
Pt. Here for OB/FU, pt states has been feeling  FM  U/S on  11/23/2022  Pt states has been having [place].

## 2022-10-25 NOTE — PROGRESS NOTES
"S: Eleanor here with her partner and daughter for routine OB follow up.  Reports + FM.  Denies VB, LOF,  RUCs or vaginal DC. Endorses occasional painful bennie powers. She drinks 2-3 glasses of water per day. Endorses feelings of emptiness and depression. Not currently in counseling but does have a history of depression & anxiety. Stopped trauma therapy a few years ago. Denies current SI/HI. Also reports severe itching on abdomen and legs. No rash or erythema but does have excoriations from intense scratching. Also reports concern for a \"spot on her back that  states is growing.\"    O:    Vitals:    10/25/22 0949   BP: 100/58   Weight: 111 lb 12.8 oz       See flow sheet.  FH: c/w 16 week size  FHT: 145 bpm    Skin: 1. no rash/redness noted on abdomen. Faint pink excoriations noted on abdomen; 2. acrochordon noted on back near left scapula; round, one color, no abnormal/irregular borders.    A: IUP 16w3d  S=D  Patient Active Problem List    Diagnosis Date Noted    Supervision of normal pregnancy 09/28/2022    Rh negative state in antepartum period, first trimester 09/28/2022    Chronic scapular pain 07/07/2021    Anxiety and depression 02/13/2019    Eczema 04/23/2018       P:  1.  Reviewed labs w pt.        2.  Desires AFP - ordered and lab slip given.        3.  US is scheduled for 11/23/2022.        4.  All questions answered. Anticipatory guidance.         5.  Encouraged increased/adequate water intake to 8-10 glasses/day.        6.  Therapy resource handout given - pt interested in therapy        7. Sertraline 50 mg daily prescribed - follow-up at next visit to assess effectiveness        8. Discussed safety plan if SI/HI occurs        9. Encouraged use of fragrance & dye free soaps, lotions. Oatmeal bath/lotion. Benadryl cream and/or benadryl at HS if needed.        10.  F/u 4 wks and PRN  Orders Placed This Encounter    AFP TETRA    sertraline (ZOLOFT) 50 MG Tab        Tita Alex, C.N.M.    This " note, management plan, and PE were supervised by Alexei Max PA-C

## 2022-11-15 ENCOUNTER — HOSPITAL ENCOUNTER (OUTPATIENT)
Dept: LAB | Facility: MEDICAL CENTER | Age: 27
End: 2022-11-15
Attending: PHYSICIAN ASSISTANT
Payer: MEDICAID

## 2022-11-15 DIAGNOSIS — Z34.81 ENCOUNTER FOR SUPERVISION OF OTHER NORMAL PREGNANCY IN FIRST TRIMESTER: ICD-10-CM

## 2022-11-15 PROCEDURE — 36415 COLL VENOUS BLD VENIPUNCTURE: CPT

## 2022-11-15 PROCEDURE — 87591 N.GONORRHOEAE DNA AMP PROB: CPT

## 2022-11-15 PROCEDURE — 87491 CHLMYD TRACH DNA AMP PROBE: CPT

## 2022-11-15 PROCEDURE — 81511 FTL CGEN ABNOR FOUR ANAL: CPT

## 2022-11-18 LAB
# FETUSES US: NORMAL
AFP MOM SERPL: 1.17
AFP SERPL-MCNC: 80 NG/ML
AGE - REPORTED: 26.3 YR
CURRENT SMOKER: YES
FAMILY MEMBER DISEASES HX: NO
GA METHOD: NORMAL
GA: NORMAL WK
HCG MOM SERPL: 0.73
HCG SERPL-ACNC: NORMAL IU/L
HX OF HEREDITARY DISORDERS: NO
IDDM PATIENT QL: NO
INHIBIN A MOM SERPL: 1.51
INHIBIN A SERPL-MCNC: 390 PG/ML
INTEGRATED SCN PATIENT-IMP: NORMAL
PATHOLOGY STUDY: NORMAL
SPECIMEN DRAWN SERPL: NORMAL
U ESTRIOL MOM SERPL: 1.18
U ESTRIOL SERPL-MCNC: 2.64 NG/ML

## 2022-11-23 ENCOUNTER — APPOINTMENT (OUTPATIENT)
Dept: RADIOLOGY | Facility: IMAGING CENTER | Age: 27
End: 2022-11-23
Attending: PHYSICIAN ASSISTANT
Payer: MEDICAID

## 2022-11-23 ENCOUNTER — ROUTINE PRENATAL (OUTPATIENT)
Dept: OBGYN | Facility: CLINIC | Age: 27
End: 2022-11-23
Payer: MEDICAID

## 2022-11-23 VITALS — BODY MASS INDEX: 21.77 KG/M2 | SYSTOLIC BLOOD PRESSURE: 116 MMHG | WEIGHT: 115.2 LBS | DIASTOLIC BLOOD PRESSURE: 56 MMHG

## 2022-11-23 DIAGNOSIS — Z34.81 ENCOUNTER FOR SUPERVISION OF OTHER NORMAL PREGNANCY IN FIRST TRIMESTER: ICD-10-CM

## 2022-11-23 DIAGNOSIS — Z34.02 ENCOUNTER FOR SUPERVISION OF NORMAL FIRST PREGNANCY IN SECOND TRIMESTER: ICD-10-CM

## 2022-11-23 LAB
C TRACH DNA SPEC QL NAA+PROBE: NEGATIVE
N GONORRHOEA DNA SPEC QL NAA+PROBE: NEGATIVE
SPECIMEN SOURCE: NORMAL

## 2022-11-23 PROCEDURE — 0502F SUBSEQUENT PRENATAL CARE: CPT | Performed by: PHYSICIAN ASSISTANT

## 2022-11-23 PROCEDURE — 76805 OB US >/= 14 WKS SNGL FETUS: CPT | Mod: TC | Performed by: STUDENT IN AN ORGANIZED HEALTH CARE EDUCATION/TRAINING PROGRAM

## 2022-11-24 NOTE — PROGRESS NOTES
Pt has no complaints with cramping, bleeding or pain, but has had continued itching on B legs and abd, back, Benadryl not helping. Pt denies rash, states she changed all lotions, soaps and detergents to fragrance free, no relief. Pt does have some dehydration, not drinking 8-10 bottles daily, also has daily Dr Pepper soda and occ Canes chicken (salty foods), but not every day. Pt urged to incr water, cut back on sodas and salty foods. Pt also takes hot showers, so will try to reduce those before bed, as that is when the itching is worse. Denies palmar or plantar itching, so no indication for cholestasis labs at this time. Pt also notes Zoloft 50mg has helped immensely with depression, will continue at current dose and refill called in today. +FM. AFP wnl, US done today but results pending. 1hr GTT next visit. RTC 4 wk or sooner prn.

## 2022-12-20 ENCOUNTER — ROUTINE PRENATAL (OUTPATIENT)
Dept: OBGYN | Facility: CLINIC | Age: 27
End: 2022-12-20
Payer: MEDICAID

## 2022-12-20 VITALS — SYSTOLIC BLOOD PRESSURE: 98 MMHG | DIASTOLIC BLOOD PRESSURE: 58 MMHG | WEIGHT: 121 LBS | BODY MASS INDEX: 22.86 KG/M2

## 2022-12-20 DIAGNOSIS — Z34.02 ENCOUNTER FOR SUPERVISION OF NORMAL FIRST PREGNANCY IN SECOND TRIMESTER: Primary | ICD-10-CM

## 2022-12-20 PROCEDURE — 0502F SUBSEQUENT PRENATAL CARE: CPT | Performed by: NURSE PRACTITIONER

## 2023-01-03 ENCOUNTER — HOSPITAL ENCOUNTER (EMERGENCY)
Facility: MEDICAL CENTER | Age: 28
End: 2023-01-03
Attending: OBSTETRICS & GYNECOLOGY | Admitting: OBSTETRICS & GYNECOLOGY
Payer: MEDICAID

## 2023-01-03 ENCOUNTER — TELEPHONE (OUTPATIENT)
Dept: OBGYN | Facility: CLINIC | Age: 28
End: 2023-01-03
Payer: MEDICAID

## 2023-01-03 VITALS
WEIGHT: 118 LBS | DIASTOLIC BLOOD PRESSURE: 66 MMHG | BODY MASS INDEX: 22.28 KG/M2 | SYSTOLIC BLOOD PRESSURE: 108 MMHG | RESPIRATION RATE: 16 BRPM | OXYGEN SATURATION: 95 % | TEMPERATURE: 97.4 F | HEIGHT: 61 IN | HEART RATE: 93 BPM

## 2023-01-03 LAB
APPEARANCE UR: CLEAR
BACTERIA #/AREA URNS HPF: ABNORMAL /HPF
BILIRUB UR QL STRIP.AUTO: NEGATIVE
COLOR UR: YELLOW
EPI CELLS #/AREA URNS HPF: ABNORMAL /HPF
GLUCOSE UR STRIP.AUTO-MCNC: 100 MG/DL
HYALINE CASTS #/AREA URNS LPF: ABNORMAL /LPF
KETONES UR STRIP.AUTO-MCNC: ABNORMAL MG/DL
LEUKOCYTE ESTERASE UR QL STRIP.AUTO: ABNORMAL
MICRO URNS: ABNORMAL
NITRITE UR QL STRIP.AUTO: NEGATIVE
PH UR STRIP.AUTO: 6 [PH] (ref 5–8)
PROT UR QL STRIP: NEGATIVE MG/DL
RBC # URNS HPF: ABNORMAL /HPF
RBC UR QL AUTO: NEGATIVE
SP GR UR STRIP.AUTO: 1.02
UROBILINOGEN UR STRIP.AUTO-MCNC: 1 MG/DL
WBC #/AREA URNS HPF: ABNORMAL /HPF

## 2023-01-03 PROCEDURE — 99282 EMERGENCY DEPT VISIT SF MDM: CPT | Mod: GC | Performed by: OBSTETRICS & GYNECOLOGY

## 2023-01-03 PROCEDURE — 81001 URINALYSIS AUTO W/SCOPE: CPT

## 2023-01-03 PROCEDURE — 99284 EMERGENCY DEPT VISIT MOD MDM: CPT

## 2023-01-03 NOTE — TELEPHONE ENCOUNTER
"23  3:21 PM  Patient reports the last two days having consistent cramping. Cramping described as pressure-like similar to a period. Cramping is located right at her lower midline \"above my vagina\". The cramping is waking her up in the middle of the night. Cramping is lasting 45 seconds to 2 minutes long and happening consecutively in clusters of hours. She has tried emptying her bladder, rest and adequate hydration. Patient reports they are \"happening no matter what I do\". . Patient reports with her last baby zuleika early in pregnancy and doing NSTs twice a week. This is different because she did not feel the contractions. Informed patient to go to labor and delivery. Gave patient instructions where the department is since she has not been there before. Scheduled follow-up for tomorrow.   "

## 2023-01-03 NOTE — TELEPHONE ENCOUNTER
"[facility] patient's  called in stating for the past 48 hours Eleanor has been experiencing clusters of either contractions or bennie powers . They come and go every hour to hour and a half and last about 45 seconds to about 2 minutes with belly tightening. Denies vaginal bleeding, LOF, or discharge . Pain scale about 4-5 but some times up to 10 and \"takes breath away\". I informed [facility] I would send this information to one of the nurses here on office Ilene so she could advise. Gurpreet verbalized understanding.   "

## 2023-01-04 ENCOUNTER — GYNECOLOGY VISIT (OUTPATIENT)
Dept: OBGYN | Facility: CLINIC | Age: 28
End: 2023-01-04
Payer: MEDICAID

## 2023-01-04 ENCOUNTER — HOSPITAL ENCOUNTER (OUTPATIENT)
Facility: MEDICAL CENTER | Age: 28
End: 2023-01-04
Payer: MEDICAID

## 2023-01-04 VITALS — WEIGHT: 155 LBS | BODY MASS INDEX: 29.29 KG/M2 | SYSTOLIC BLOOD PRESSURE: 102 MMHG | DIASTOLIC BLOOD PRESSURE: 60 MMHG

## 2023-01-04 DIAGNOSIS — O26.899 CRAMPING AFFECTING PREGNANCY, ANTEPARTUM: ICD-10-CM

## 2023-01-04 DIAGNOSIS — R10.9 CRAMPING AFFECTING PREGNANCY, ANTEPARTUM: ICD-10-CM

## 2023-01-04 DIAGNOSIS — Z34.82 ENCOUNTER FOR SUPERVISION OF OTHER NORMAL PREGNANCY IN SECOND TRIMESTER: ICD-10-CM

## 2023-01-04 PROCEDURE — 87480 CANDIDA DNA DIR PROBE: CPT

## 2023-01-04 PROCEDURE — 87660 TRICHOMONAS VAGIN DIR PROBE: CPT

## 2023-01-04 PROCEDURE — 87510 GARDNER VAG DNA DIR PROBE: CPT

## 2023-01-04 PROCEDURE — 99212 OFFICE O/P EST SF 10 MIN: CPT

## 2023-01-04 NOTE — DISCHARGE INSTRUCTIONS
Return for follow up visit tomorrow as scheduled     1-hour Glucose testing as soon as possible    Call or come to ED for: cramping >6 in 1 hour heavy vaginal bleeding, fever >100.4, severe abdominal pain, severe headache, chest pain, shortness of breath,  N/V, or other concerns.     Pre-term Labor (<37 weeks):  Call your physician or return to the hospital if:  You have painless regular contractions more than 4 in one hour.  Your water breaks (remember time and color).  You have menstrual-like cramps, a low dull backache or pressure in your pelvis or back.  Your baby does not move enough to complete the daily kick count (10 movements in 2 hours).  Your baby moves much less often than on the days before or you have not felt your baby move all day.  Please review the MEDICATION LIST section of your AFTER VISIT SUMMARY document.  Take your medication as prescribed

## 2023-01-04 NOTE — PROGRESS NOTES
"S:   Eleanor is a 27 y.o.  at 26w4d. Her TRES is 2023, by Ultrasound. She is here for routine OB follow up after triage visit in hospital.  Reports positive FM.  Denies VB, LOF, RUCs. Reports cramping that has not improved since her visit to OB Triage yesterday. She is drinking 3-4 liters of water per day. Endorses increased vaginal DC that is \"white and sticky.\" No UTI per lab results from OB Triage.    Current Outpatient Medications on File Prior to Visit   Medication Sig Dispense Refill    sertraline (ZOLOFT) 50 MG Tab Take 1 Tablet by mouth every day. 30 Tablet 11    Prenatal MV-Min-Fe Fum-FA-DHA (PRENATAL 1 PO) Take  by mouth.      aspirin (ASPIRIN 81) 81 MG Chew Tab chewable tablet Chew 1 Tablet every day. 100 Tablet 3     No current facility-administered medications on file prior to visit.         O:    Vitals:    23 1102   BP: 102/60   Weight: 155 lb       FH: 27 cm  FHT: 133 BPM      A:   IUP 26w4d  S=D  Uterine cramping  Patient Active Problem List    Diagnosis Date Noted    Supervision of normal pregnancy 2022    Rh negative state in antepartum period, first trimester 2022    Chronic scapular pain 2021    Anxiety and depression - started on Zoloft 50mg 10/25 2019    Eczema 2018       P:    Questions answered. Anticipatory guidance.  Encouraged adequate water intake   labor and return precautions reviewed - patient verbalized understanding.  Rhogam and Tdap next visit  Counseled on importance of labs - encouraged to get 3rd trimester labs drawn - verbalized understanding- antibody screen ordered today to be collected with 3t labs  Vaginal pathogen collected today - will follow up with results  F/u 2 wks and PRN    Orders Placed This Encounter    ANTIBODY SCREEN    VAGINAL PATHOGENS DNA PANEL        Pregnancy Checklist  Prenatal labs: PNL WNL, except RH neg, will need rhogam  EPDS: not completed  Last Pap: 21: NILM  Quad screen/NIPT/MASFP/Carrier " Screen: quad screen neg  Anatomy US: fetal survey WNL  3rd trimester labs: ordered but not collected as of 1/4/23  Tdap:   Flu vaccine: declined 9/28/22  Rhogam: O neg; will need rhogam at 28 wks  PP Contraception plan:  Bilateral salpingectomy:  GBS:   Hx C/S: no  Desires TOLAC?: N/A  IOL plan:    Tita Alex C.N.M.

## 2023-01-04 NOTE — PROGRESS NOTES
OB follow up   + fetal movement.  No VB, LOF or UC's.  Phone # [phone]  Preferred pharmacy confirmed.  3rd trimester labs not done yet, will do tomorrow  L&D yesterday due to UC's. Still having cramping. Denies UTI symptoms

## 2023-01-04 NOTE — PROGRESS NOTES
1702 28 y/o  EDC 23, EGA 26.2, here to L&D with c/o painful uterine  contractions that radiate to low back for 2 days. EFM/TOCO applied, pt states positive FM. Denies vaginal LOF or bleeding.     171 Dr. Thomson given report, per MD send UA and IV hydrate if uterine contractions become more frequent    1800 Dr. Thomson to bedside, per MD discharge patient    182 Discharge instructions complete, patient has no further questions at this time, patient to follow-up in office tomorrow for scheduled appointment

## 2023-01-04 NOTE — ED PROVIDER NOTES
"LABOR AND DELIVERY TRIAGE NOTE    PATIENT ID:  NAME:  Eleanor Centeno  MRN:               [id number]  YOB: 1995    27 y.o. female  at 26w3d by 8w4d US.    Subjective: Pt states she has had contractions for the past two days. Have been on and off with maximum of 3-4 an hour with large spans of time without contractions. No other complaints including dysuria, urgency, frequency. States she has been well hydrated the last couple of days drinking 7-8 bottles of water daily. Prior to that reports 5 bottles of water daily.     States last pregnancy she had  contractions for which she completed weekly NST. Last baby delivered at term.     Pregnancy complicated by maternal anxiety/depression. Also takes albuterol PRN for asthma but has not needed recently.     positive for contractions  negative pain   negative for LOF  negative for vaginal bleeding  positive for fetal movement    PNL:  Blood Type O negative, Rubella immune, HIV neg, TrepAb neg, HBsAg NR, GC/CT neg/neg  1 HR GTT: N/A  3 HR GTT: N/A  GBS unknown     ROS: Patient denies any fever chills, nausea, vomiting, headache, chest pain, shortness of breath, or dysuria or unusual swelling of hands or feet.     PMHx:   Past Medical History:   Diagnosis Date    Anxiety     Asthma     \"sports asthma\"    Back pain     Chronic back pain     related to scolosis    Depression     Migraine     Rh negative state in antepartum period, first trimester 2022    Spina bifida occulta         OB History    Para Term  AB Living   4 2 2   1 2   SAB IAB Ectopic Molar Multiple Live Births   1         2      # Outcome Date GA Lbr Sami/2nd Weight Sex Delivery Anes PTL Lv   4 Current            3 Term 17 39w0d  2.863 kg (6 lb 5 oz) F Vag-Spont  N RADHA   2 Term 10/01/15 39w0d  2.693 kg (5 lb 15 oz) F Vag-Spont  Y RADHA   1 SAB                GYN: denies STIs, no cervical procedures    PSHx:  Past Surgical History:   Procedure Laterality Date    " INSERTION OR REMOVAL, IUD N/A 2021    Procedure: INSERTION OR REMOVAL,IUD - REMOVAL;  Surgeon: Valeriy Emery M.D.;  Location: SURGERY SAME DAY [facility];  Service: Obstetrics    DENTAL EXTRACTION(S)      [place] teeth    NM REMOVAL OF TONSILS,<13 Y/O         Social Hx:  Social History     Tobacco Use    Smoking status: Some Days     Packs/day: 0.50     Types: Cigarettes    Smokeless tobacco: Never    Tobacco comments:     Less than a pack a day.    Vaping Use    Vaping Use: Never used   Substance Use Topics    Alcohol use: No    Drug use: No         Medications:  No current facility-administered medications on file prior to encounter.     Current Outpatient Medications on File Prior to Encounter   Medication Sig Dispense Refill    Prenatal MV-Min-Fe Fum-FA-DHA (PRENATAL 1 PO) Take  by mouth.      aspirin (ASPIRIN 81) 81 MG Chew Tab chewable tablet Chew 1 Tablet every day. 100 Tablet 3       Allergies:  NKDA      Objective:    There were no vitals filed for this visit.  No data recorded.    General: No acute distress, resting comfortably in bed.  HEENT: normocephalic, nontraumatic, PERRLA, EOMI  Cardiovascular: Heart RRR with no murmurs, rubs or gallops. Distal Pulses 2+  Respiratory: symmetric chest expansion, lungs CTAB, with no wheezes, rales, rhonci  Abdomen: gravid, nontender  Musculoskeletal: ANGULO spontaneously  Neuro: non focal with no numbness, tingling or changes in sensation    Cutler: Rare uterine contraction  FHRM: Baseline 140, moderate variability, no accels, no decels    Labs:   Reviewed    Assessment: 27 y.o. female  at 26w3d presents with concern of contractions.     Plan:   - Patient UA without evidence of UTI  - FHRM appropriate for gestational age   - Patient needs to complete 1-hour glucose testing  - Patient is cleared to return home with family. Encouraged to see MD/DO for increased painful uterine contractions >6 in one hour, vaginal bleeding, loss of fluid, or other serious  symptoms.      Discussed case with Dr. Thomson, [facility] Attending. Case was discussed and attending agreed with plan prior to discharge of patient.      Hope Hyde M.D.

## 2023-01-05 LAB
CANDIDA DNA VAG QL PROBE+SIG AMP: POSITIVE
G VAGINALIS DNA VAG QL PROBE+SIG AMP: NEGATIVE
T VAGINALIS DNA VAG QL PROBE+SIG AMP: NEGATIVE

## 2023-01-11 ENCOUNTER — HOSPITAL ENCOUNTER (OUTPATIENT)
Dept: LAB | Facility: MEDICAL CENTER | Age: 28
End: 2023-01-11
Payer: MEDICAID

## 2023-01-11 DIAGNOSIS — Z34.82 ENCOUNTER FOR SUPERVISION OF OTHER NORMAL PREGNANCY IN SECOND TRIMESTER: ICD-10-CM

## 2023-01-11 DIAGNOSIS — Z34.02 ENCOUNTER FOR SUPERVISION OF NORMAL FIRST PREGNANCY IN SECOND TRIMESTER: ICD-10-CM

## 2023-01-11 LAB
BLD GP AB SCN SERPL QL: NORMAL
ERYTHROCYTE [DISTWIDTH] IN BLOOD BY AUTOMATED COUNT: 46.7 FL (ref 35.9–50)
GLUCOSE 1H P 50 G GLC PO SERPL-MCNC: 133 MG/DL (ref 70–139)
HCT VFR BLD AUTO: 35.4 % (ref 37–47)
HGB BLD-MCNC: 11.7 G/DL (ref 12–16)
MCH RBC QN AUTO: 32.5 PG (ref 27–33)
MCHC RBC AUTO-ENTMCNC: 33.1 G/DL (ref 33.6–35)
MCV RBC AUTO: 98.3 FL (ref 81.4–97.8)
PLATELET # BLD AUTO: 155 K/UL (ref 164–446)
PMV BLD AUTO: 11.9 FL (ref 9–12.9)
RBC # BLD AUTO: 3.6 M/UL (ref 4.2–5.4)
T PALLIDUM AB SER QL IA: NORMAL
WBC # BLD AUTO: 10.1 K/UL (ref 4.8–10.8)

## 2023-01-11 PROCEDURE — 85027 COMPLETE CBC AUTOMATED: CPT

## 2023-01-11 PROCEDURE — 86850 RBC ANTIBODY SCREEN: CPT

## 2023-01-11 PROCEDURE — 86780 TREPONEMA PALLIDUM: CPT

## 2023-01-11 PROCEDURE — 36415 COLL VENOUS BLD VENIPUNCTURE: CPT

## 2023-01-11 PROCEDURE — 82950 GLUCOSE TEST: CPT

## 2023-01-14 ENCOUNTER — HOSPITAL ENCOUNTER (EMERGENCY)
Facility: MEDICAL CENTER | Age: 28
End: 2023-01-15
Attending: OBSTETRICS & GYNECOLOGY | Admitting: OBSTETRICS & GYNECOLOGY
Payer: MEDICAID

## 2023-01-14 DIAGNOSIS — F41.9 ANXIETY AND DEPRESSION: ICD-10-CM

## 2023-01-14 DIAGNOSIS — F32.A ANXIETY AND DEPRESSION: ICD-10-CM

## 2023-01-14 DIAGNOSIS — N76.0 ACUTE VAGINITIS: ICD-10-CM

## 2023-01-14 PROCEDURE — 99284 EMERGENCY DEPT VISIT MOD MDM: CPT

## 2023-01-15 VITALS
HEART RATE: 101 BPM | DIASTOLIC BLOOD PRESSURE: 58 MMHG | BODY MASS INDEX: 29.27 KG/M2 | OXYGEN SATURATION: 97 % | SYSTOLIC BLOOD PRESSURE: 100 MMHG | WEIGHT: 155 LBS | HEIGHT: 61 IN | RESPIRATION RATE: 16 BRPM | TEMPERATURE: 98.3 F

## 2023-01-15 LAB — FIBRONECTIN FETAL SPEC QL: NEGATIVE

## 2023-01-15 PROCEDURE — 99282 EMERGENCY DEPT VISIT SF MDM: CPT | Performed by: ADVANCED PRACTICE MIDWIFE

## 2023-01-15 PROCEDURE — 82731 ASSAY OF FETAL FIBRONECTIN: CPT

## 2023-01-15 PROCEDURE — A9270 NON-COVERED ITEM OR SERVICE: HCPCS | Performed by: ADVANCED PRACTICE MIDWIFE

## 2023-01-15 PROCEDURE — 59025 FETAL NON-STRESS TEST: CPT

## 2023-01-15 PROCEDURE — 700102 HCHG RX REV CODE 250 W/ 637 OVERRIDE(OP): Performed by: ADVANCED PRACTICE MIDWIFE

## 2023-01-15 RX ORDER — HYDROXYZINE HYDROCHLORIDE 25 MG/1
25 TABLET, FILM COATED ORAL ONCE
Status: COMPLETED | OUTPATIENT
Start: 2023-01-15 | End: 2023-01-15

## 2023-01-15 RX ORDER — ACETAMINOPHEN 500 MG
1000 TABLET ORAL ONCE
Status: COMPLETED | OUTPATIENT
Start: 2023-01-15 | End: 2023-01-15

## 2023-01-15 RX ORDER — HYDROXYZINE HYDROCHLORIDE 25 MG/1
25 TABLET, FILM COATED ORAL 2 TIMES DAILY PRN
Qty: 20 TABLET | Refills: 0 | Status: SHIPPED | OUTPATIENT
Start: 2023-01-15 | End: 2023-03-23

## 2023-01-15 RX ADMIN — ACETAMINOPHEN 1000 MG: 500 TABLET ORAL at 02:34

## 2023-01-15 RX ADMIN — HYDROXYZINE HYDROCHLORIDE 25 MG: 25 TABLET, FILM COATED ORAL at 02:44

## 2023-01-15 ASSESSMENT — ENCOUNTER SYMPTOMS
CONSTITUTIONAL NEGATIVE: 1
ABDOMINAL PAIN: 1
VOMITING: 0
NAUSEA: 0
ROS GI COMMENTS: DECREASED APPETITE
CONSTIPATION: 0

## 2023-01-15 NOTE — ED PROVIDER NOTES
"Emergency Obstetric Consultation     Date of Service  1/15/2023    Reason for Consultation  No chief complaint on file.      History of Presenting Illness  27 y.o. female who presented 1/15/2023 with Reason for consult: contractionsnauseaPatient reports that she has been having cramping, contractions and low back pain over the past two weeks. She was seen on L & D 10 days ago for similar complaint and noted to have yeast infection. This was not treated. Patient and partner report that in the past day the pain has worsened.     She reports  contractions with her last baby but subsequent term delivery.     Denies vaginal bleeding but does report some spotting.   Contractions: Onset was 1 week or more ago.    Perceived severity is mild.    Fetal activity: Perceived fetal activity is normal.    Prenatal complications: no prenatal complications  .    Review of Systems  Review of Systems   Constitutional: Negative.    Gastrointestinal:  Positive for abdominal pain. Negative for constipation, nausea and vomiting.        Decreased appetite   Genitourinary:  Negative for dysuria and urgency.     Obstetric History    OB History    Para Term  AB Living   4 2 2   1 2   SAB IAB Ectopic Molar Multiple Live Births   1         2      # Outcome Date GA Lbr Sami/2nd Weight Sex Delivery Anes PTL Lv   4 Current            3 Term 17 39w0d  2.863 kg (6 lb 5 oz) F Vag-Spont  N RADHA   2 Term 10/01/15 39w0d  2.693 kg (5 lb 15 oz) F Vag-Spont  Y RADHA   1 SAB                Gynecologic History  Patient's last menstrual period was 2022 (exact date).    Medical History  Past Medical History:   Diagnosis Date    Anxiety     Asthma     \"sports asthma\"    Back pain     Chronic back pain     related to scolosis    Depression     Migraine     Rh negative state in antepartum period, first trimester 2022    Spina bifida occulta        Surgical History   has a past surgical history that includes pr removal of " tonsils,<11 y/o; dental extraction(s); and insertion or removal, iud (N/A, 2021).    Family History  family history includes Alcohol/Drug in her mother and sister; Arthritis in her mother; Cancer in her maternal aunt and mother; Hypertension in her mother; Lung Disease in her mother; Ovarian Cancer in her mother; Psychiatric Illness in her maternal aunt, mother, and sister; Seizures in her sister.    Social History   reports that she has been smoking cigarettes. She has been smoking an average of .5 packs per day. She has never used smokeless tobacco. She reports that she does not drink alcohol and does not use drugs.    Medications  Medications Prior to Admission   Medication Sig Dispense Refill Last Dose    sertraline (ZOLOFT) 50 MG Tab Take 1 Tablet by mouth every day. 30 Tablet 11     Prenatal MV-Min-Fe Fum-FA-DHA (PRENATAL 1 PO) Take  by mouth.       aspirin (ASPIRIN 81) 81 MG Chew Tab chewable tablet Chew 1 Tablet every day. 100 Tablet 3        Allergies  Allergies   Allergen Reactions    Tape Rash     Adhesives- occasional reactions       Physical Exam  Maternal Exam:  Uterine Assessment: Contraction strength is mild.  Contraction frequency is irregular and rare.   Abdomen: Patient reports generalized tenderness.   Fetal presentation: vertex  Introitus: Normal vulva. Vagina is positive for vaginal discharge.   Amniotic fluid character: not assessed.  Abdominal:      Tenderness: There is generalized abdominal tenderness.   Genitourinary:     General: Normal vulva.      Vagina: Vaginal discharge present.      Cervix: Friability present.       Assessment & Plan  Assessment:  Not in labor.   Membrane status: intact.   Fetal well-being: normal.   1. 28 yo  with IUP at 28.1 weeks  2. Reassuring Fetal Status  3. Vaginitis  4. Lower abdominal pain    Plan:  1. At this time, vaginal pathogens and ffn. If negative, will provide reassurance and plan for tylenol and vistaril for comfort.   2. Will treat yeast  infection and cream sent to pharmacy.         JARROD Bauman

## 2023-01-15 NOTE — PROGRESS NOTES
Presenting,  28 weeks presenting in triage with complaint of cramping and low back pain. Patient reports being seen in triage about a week ago for the same complaint was discharged home. Patient denies LOF, vaginal bleeding report good fetal movement.     2338 BRIJESH Arellano at bedside to see patient. RN received orders for FFN and vaginal pathogen swab.     0210 BRIJESH Arellano informed FFN is negative.    0221 BRIJESH Arellano at bedside to speak with patient. POC discussed. Patient to follow with OB appointment.

## 2023-01-19 ENCOUNTER — ROUTINE PRENATAL (OUTPATIENT)
Dept: OBGYN | Facility: CLINIC | Age: 28
End: 2023-01-19
Payer: MEDICAID

## 2023-01-19 VITALS — WEIGHT: 130.2 LBS | SYSTOLIC BLOOD PRESSURE: 100 MMHG | BODY MASS INDEX: 24.6 KG/M2 | DIASTOLIC BLOOD PRESSURE: 50 MMHG

## 2023-01-19 DIAGNOSIS — Z67.91 RH NEGATIVE STATE IN ANTEPARTUM PERIOD, FIRST TRIMESTER: ICD-10-CM

## 2023-01-19 DIAGNOSIS — O26.891 RH NEGATIVE STATE IN ANTEPARTUM PERIOD, FIRST TRIMESTER: ICD-10-CM

## 2023-01-19 DIAGNOSIS — Z34.03 ENCOUNTER FOR SUPERVISION OF NORMAL FIRST PREGNANCY IN THIRD TRIMESTER: ICD-10-CM

## 2023-01-19 PROCEDURE — 96372 THER/PROPH/DIAG INJ SC/IM: CPT | Performed by: PHYSICIAN ASSISTANT

## 2023-01-19 PROCEDURE — 90715 TDAP VACCINE 7 YRS/> IM: CPT | Performed by: PHYSICIAN ASSISTANT

## 2023-01-19 PROCEDURE — 90471 IMMUNIZATION ADMIN: CPT | Performed by: PHYSICIAN ASSISTANT

## 2023-01-19 PROCEDURE — 0502F SUBSEQUENT PRENATAL CARE: CPT | Performed by: PHYSICIAN ASSISTANT

## 2023-01-19 RX ORDER — BREAST PUMP
1 EACH MISCELLANEOUS PRN
Qty: 1 EACH | Refills: 0 | Status: SHIPPED
Start: 2023-01-19 | End: 2023-03-23

## 2023-01-19 NOTE — PROGRESS NOTES
Pt has no complaints with cramping, current UCs, VB, LOF, though pt has constant pressure and has been in L&D for UCs, with FFN neg last visit. +FM. PTL precautions stressed today. TDaP and Rhogam given today. Pt unsure about BTL - will let us know. Breast pump rx given today. RTC 2 wk or sooner prn.

## 2023-01-19 NOTE — LETTER
"Count Your Baby's Movements  Another step to a healthy delivery        How Many Weeks Pregnant? 28w5d    Date to Begin Countin2023              How to use this chart    One way for your physician to keep track of your baby's health is by knowing how often the baby moves (or \"kicks\") in your womb.  You can help your physician to do this by using this chart every day.    Every day, you should see how many hours it takes for your baby to move 10 times.  Start in the morning, as soon as you get up.    · First, write down the time your baby moves until you get to 10.  · Check off one box every time your baby moves until you get to 10.  · Write down the time you finished counting in the last column.  · Total how long it took to count up all 10 movements.  · Finally, fill in the box that shows how long this took.  After counting 10 movements, you no longer have to count any more that day.  The next morning, just start counting again as soon as you get up.    What should you call a \"movement\"?  It is hard to say, because it will feel different from one mother to another and from one pregnancy to the next.  The important thing is that you count the movements the same way throughout your pregnancy.  If you have more questions, you should ask your physician.    Count carefully every day!  SAMPLE:  Week 28    How many hours did it take to feel 10 movements?       Start  Time     1     2     3     4     5     6     7     8     9     10   Finish Time   Mon 8:20 ·  ·  ·  ·  ·  ·  ·  ·  ·  ·  11:40   Tu               Sat               Sun                 IMPORTANT: You should contact your physician if it takes more than two hours for you to feel 10 movements.  Each morning, write down the time and start to count the movements of your baby.  Keep track by checking off one box every time you feel one movement.  When you have felt 10 \"kicks\", write down the time you " finished counting in the last column.  Then fill in the   box (over the check rock) for the number of hours it took.  Be sure to read the complete instructions on the previous page.

## 2023-02-01 ENCOUNTER — ROUTINE PRENATAL (OUTPATIENT)
Dept: OBGYN | Facility: CLINIC | Age: 28
End: 2023-02-01
Payer: MEDICAID

## 2023-02-01 VITALS — WEIGHT: 129.3 LBS | SYSTOLIC BLOOD PRESSURE: 92 MMHG | DIASTOLIC BLOOD PRESSURE: 54 MMHG | BODY MASS INDEX: 24.43 KG/M2

## 2023-02-01 DIAGNOSIS — Z34.83 ENCOUNTER FOR SUPERVISION OF OTHER NORMAL PREGNANCY IN THIRD TRIMESTER: Primary | ICD-10-CM

## 2023-02-01 PROCEDURE — 0502F SUBSEQUENT PRENATAL CARE: CPT | Performed by: NURSE PRACTITIONER

## 2023-02-01 NOTE — PROGRESS NOTES
Pt. Here for OB/FU. Reports movement has decreased within the last 3 days.   Pt. Denies VB, LOF, or UC's.   Pt states she has been having bennie powers/cramping.   Pt also has been having diarrhea X3 days.  Flu offered and declined.

## 2023-02-01 NOTE — PROGRESS NOTES
S) Pt is a 27 y.o.   at 30w4d  gestation. Routine prenatal care today. Complaining of some diarrhea x 3 days. States she has had little to no appetite. Discussed remedies, and recommended small frequent nutritionally packed meals. Can use immodium OTC if desired. Gatorade for hydration/electrolytes. Denies any fever, chills, nausea, vomiting, or severe pain Educated on need for sooner follow up for worsening symptoms. Starting to feel some Jareth Rivero, up to 3x daily. Discussed normalcy of this and PTL precautions reviewed. All caught up on labs and US. Tdap done, declines flu. No BTL. All questions answered.    Fetal movement Normal  Cramping no  VB no  LOF no   Denies dysuria. Generally feels well today. Good self-care activities identified. Denies headaches, swelling, visual changes, or epigastric pain .     O) BP 92/54   Wt 129 lb 4.8 oz         Labs:       PNL: WNL, rh negative, rhogam done       GCT: 133        AFP: normal       GBS: N/A       Pertinent ultrasound -        2022- Survey WNL, TAMMY 17.37cm, c/w prev dating. EFW 59%    A) IUP at 30w4d       S=D         Patient Active Problem List    Diagnosis Date Noted    Supervision of normal pregnancy 2022    Rh negative state in antepartum period - Rhogam given 2022    Chronic scapular pain 2021    Anxiety and depression - started on Zoloft 50mg 10/25 2019    Eczema 2018          SVE: deferred       Chaperone offered: n/a         TDAP: yes       FLU: no        BTL: no       :  n/a       C/S Consent:  n/a       IOL or C/S scheduled: no       LAST PAP: 2021- NILM         P) s/s ptl vs general discomforts. Fetal movements reviewed. General ed and anticipatory guidance. Nutrition/exercise/vitamin. Plans breast Plans pp contraception- unsure  Continue PNV.

## 2023-02-15 ENCOUNTER — ROUTINE PRENATAL (OUTPATIENT)
Dept: OBGYN | Facility: CLINIC | Age: 28
End: 2023-02-15
Payer: MEDICAID

## 2023-02-15 VITALS — DIASTOLIC BLOOD PRESSURE: 56 MMHG | WEIGHT: 131.4 LBS | SYSTOLIC BLOOD PRESSURE: 90 MMHG | BODY MASS INDEX: 24.83 KG/M2

## 2023-02-15 DIAGNOSIS — Z34.81 ENCOUNTER FOR SUPERVISION OF OTHER NORMAL PREGNANCY IN FIRST TRIMESTER: ICD-10-CM

## 2023-02-15 PROCEDURE — 0502F SUBSEQUENT PRENATAL CARE: CPT | Performed by: PHYSICIAN ASSISTANT

## 2023-02-15 NOTE — PROGRESS NOTES
Pt. Here for OB/FU. Reports Good FM.   Pt states has been having bennie powers and contractions.   BTL offered and declined.

## 2023-02-15 NOTE — PROGRESS NOTES
Pt has no complaints with cramping, VB, LOF but pt has had UCs over past 3 days, some stronger, some lighter, up to q 15 min prn. +FM. Pt denies intercourse past 24 hours. FFN collected but not sent. Cervix: Cl/50/-1, post and L sided, firm, vtx. PTL precautions reviewed. Daily FKC recommended. US confirms vtx today. RTC 2 wk or sooner prn.

## 2023-03-02 ENCOUNTER — ROUTINE PRENATAL (OUTPATIENT)
Dept: OBGYN | Facility: CLINIC | Age: 28
End: 2023-03-02
Payer: MEDICAID

## 2023-03-02 VITALS — BODY MASS INDEX: 25.66 KG/M2 | DIASTOLIC BLOOD PRESSURE: 54 MMHG | WEIGHT: 135.8 LBS | SYSTOLIC BLOOD PRESSURE: 100 MMHG

## 2023-03-02 DIAGNOSIS — Z34.83 ENCOUNTER FOR SUPERVISION OF OTHER NORMAL PREGNANCY IN THIRD TRIMESTER: ICD-10-CM

## 2023-03-02 PROCEDURE — 0502F SUBSEQUENT PRENATAL CARE: CPT | Performed by: PHYSICIAN ASSISTANT

## 2023-03-02 NOTE — PROGRESS NOTES
Pt has no complaints with cramping, VB, LOF, though pt continues to have UCs that are getting stronger and more frequent, though pt hasnt needed to go to L&D yet. +FM. Cervix: 1/50/-1, very post, soft, vtx. PTL precautions stressed today. Will plan 1 wk f/u or sooner prn, GBS next visit. Daily FKC recommended.

## 2023-03-02 NOTE — PROGRESS NOTES
Pt. Here for OB/FU. Reports Good FM.   Pt states having contractions that are getting more painful.

## 2023-03-09 ENCOUNTER — ROUTINE PRENATAL (OUTPATIENT)
Dept: OBGYN | Facility: CLINIC | Age: 28
End: 2023-03-09
Payer: MEDICAID

## 2023-03-09 ENCOUNTER — HOSPITAL ENCOUNTER (OUTPATIENT)
Facility: MEDICAL CENTER | Age: 28
End: 2023-03-09
Attending: PHYSICIAN ASSISTANT
Payer: MEDICAID

## 2023-03-09 VITALS — WEIGHT: 137 LBS | BODY MASS INDEX: 25.89 KG/M2 | SYSTOLIC BLOOD PRESSURE: 116 MMHG | DIASTOLIC BLOOD PRESSURE: 58 MMHG

## 2023-03-09 DIAGNOSIS — Z34.83 ENCOUNTER FOR SUPERVISION OF OTHER NORMAL PREGNANCY IN THIRD TRIMESTER: ICD-10-CM

## 2023-03-09 PROCEDURE — 87081 CULTURE SCREEN ONLY: CPT

## 2023-03-09 PROCEDURE — 87150 DNA/RNA AMPLIFIED PROBE: CPT

## 2023-03-09 PROCEDURE — 0502F SUBSEQUENT PRENATAL CARE: CPT | Performed by: PHYSICIAN ASSISTANT

## 2023-03-09 NOTE — PROGRESS NOTES
Pt has no complaints with cramping, regular or strong UCs, Vb, LOF. +FM. Pt has had more UCs over past week. Pt also has had congestion for 5 days, denies fevers, chills, but has had congestion with cough from post nasal drip - taking cough drops, Tylenol prn. GBS done today. Cervix: 1/50/-1, very post, firm, vtx. Labor precautions reviewed. Daily FKC recommended. RTC 1 wk or sooner prn.

## 2023-03-09 NOTE — PROGRESS NOTES
Pt. Here for OB/FU and GBS today . Reports Good FM.   Pt states having contractions that come and go.

## 2023-03-10 LAB — GP B STREP DNA SPEC QL NAA+PROBE: POSITIVE

## 2023-03-15 ENCOUNTER — APPOINTMENT (OUTPATIENT)
Dept: RADIOLOGY | Facility: IMAGING CENTER | Age: 28
End: 2023-03-15
Attending: PHYSICIAN ASSISTANT
Payer: MEDICAID

## 2023-03-15 ENCOUNTER — ROUTINE PRENATAL (OUTPATIENT)
Dept: OBGYN | Facility: CLINIC | Age: 28
End: 2023-03-15
Payer: MEDICAID

## 2023-03-15 VITALS — WEIGHT: 135 LBS | SYSTOLIC BLOOD PRESSURE: 100 MMHG | DIASTOLIC BLOOD PRESSURE: 56 MMHG | BODY MASS INDEX: 25.51 KG/M2

## 2023-03-15 DIAGNOSIS — Z34.83 ENCOUNTER FOR SUPERVISION OF OTHER NORMAL PREGNANCY IN THIRD TRIMESTER: ICD-10-CM

## 2023-03-15 PROBLEM — O99.820 GBS (GROUP B STREPTOCOCCUS CARRIER), +RV CULTURE, CURRENTLY PREGNANT: Status: ACTIVE | Noted: 2023-03-15

## 2023-03-15 PROCEDURE — 76816 OB US FOLLOW-UP PER FETUS: CPT | Mod: TC | Performed by: PHYSICIAN ASSISTANT

## 2023-03-15 PROCEDURE — 0502F SUBSEQUENT PRENATAL CARE: CPT | Performed by: PHYSICIAN ASSISTANT

## 2023-03-15 PROCEDURE — 76820 UMBILICAL ARTERY ECHO: CPT | Mod: TC | Performed by: PHYSICIAN ASSISTANT

## 2023-03-15 RX ORDER — BENZONATATE 100 MG/1
100 CAPSULE ORAL 3 TIMES DAILY PRN
Qty: 20 CAPSULE | Refills: 1 | Status: SHIPPED | OUTPATIENT
Start: 2023-03-15 | End: 2023-05-02

## 2023-03-15 NOTE — PROGRESS NOTES
S: 27 y.o.  at 36w4d presents for routine obstetric follow-up.   Good fetal movement.  No vaginal bleeding, or leakage of fluid.    Questions answered.  Reports contractions 10-20 min apart.  Pt sick with cough. Using lozenges only.     O: /56   Wt 61.2 kg (135 lb)   LMP 2022 (Exact Date)   BMI 25.51 kg/m²   Patients' weight gain, fluid intake and exercise level discussed.  Vitals, fundal height , fetal position, and FHR reviewed on flowsheet    TDaP: Administered 23  GBS: Positive - education provided  Rh: negative  BTL: declines,  getting vasectomy     A/P:  27 y.o.  at 36w4d presents for routine obstetric follow-up.  Size equals dates and/or scan    - Pt measuring small today, lost weight, and no change in measurement at last visit. Likely due to low position and sick but will get Growth US.   - Rx for Tessalon Perles for cough as this is exacerbating contractions   - Continue prenatal vitamins- pills not gummies.  - Fetal kick counts.  - Exercise at least 30 minutes daily. Drink at least 3-4L of water daily  - PTL precautions educated.    Follow-up in 1 weeks.    Tanvi Amado P.A.-C.

## 2023-03-15 NOTE — PROGRESS NOTES
Pt. Here for OB/FU. Reports Good FM.   Pt. Denies VB, LOF, or UC's.   Pt states she is having contractions that are becoming more frequent. She also states she has has a headache x4 days that nothing seems to help get rid of it.   GBS +Positive

## 2023-03-16 DIAGNOSIS — Z34.83 ENCOUNTER FOR SUPERVISION OF OTHER NORMAL PREGNANCY IN THIRD TRIMESTER: ICD-10-CM

## 2023-03-23 ENCOUNTER — ROUTINE PRENATAL (OUTPATIENT)
Dept: OBGYN | Facility: CLINIC | Age: 28
End: 2023-03-23
Payer: MEDICAID

## 2023-03-23 VITALS — DIASTOLIC BLOOD PRESSURE: 66 MMHG | BODY MASS INDEX: 25.89 KG/M2 | SYSTOLIC BLOOD PRESSURE: 106 MMHG | WEIGHT: 137 LBS

## 2023-03-23 DIAGNOSIS — O36.5990 POOR FETAL GROWTH AFFECTING MANAGEMENT OF MOTHER, ANTEPARTUM, SINGLE OR UNSPECIFIED FETUS: ICD-10-CM

## 2023-03-23 LAB
NST ACOUSTIC STIMULATION: NORMAL
NST ACTION NECESSARY: NORMAL
NST ASSESSMENT: NORMAL
NST BASELINE: NORMAL
NST INDICATIONS: NORMAL
NST OTHER DATA: NORMAL
NST READ BY: NORMAL
NST RETURN: NORMAL
NST UTERINE ACTIVITY: NORMAL

## 2023-03-23 PROCEDURE — 0502F SUBSEQUENT PRENATAL CARE: CPT | Performed by: NURSE PRACTITIONER

## 2023-03-23 PROCEDURE — 59025 FETAL NON-STRESS TEST: CPT | Performed by: NURSE PRACTITIONER

## 2023-03-23 NOTE — PROGRESS NOTES
SUBJECTIVE:  Pt is a 27 y.o.   at 37w5d  gestation. Presents today for follow-up prenatal care. Reports no issues at this time.  Reports good  fetal movement. Denies regular cramping/contractions, bleeding or leaking of fluid. Denies dysuria, headaches, N/V. Generally feels well today except irregular contractions  and some clear leaking fluid for past two days.     OBJECTIVE:  - See prenatal vitals flow  -   Vitals:    23 1147   BP: 106/66   Weight: 137 lb                 ASSESSMENT:   - IUP at 37w5d    - S=D   - Neg valsalva, neg pooling, neg nitrazine, neg fern   Patient Active Problem List    Diagnosis Date Noted    GBS (group B Streptococcus carrier), +RV culture, currently pregnant 03/15/2023    Supervision of normal pregnancy 2022    Rh negative state in antepartum period - Rhogam given 2022    Anxiety and depression - started on Zoloft 50mg 10/25 2019         PLAN:  - S/sx pregnancy and labor warning signs vs general discomforts discussed  - Fetal movements and/or kick counts reviewed   - Adequate hydration reinforced  - Nutrition/exercise/vitamin education; continue PNV  - Desires membrane sweet today   - NST today   - IOL planned for 38-39 week as per Dr. Brush  - GBS positive   - NST twice weekly until IOL  - Anticipatory guidance given  - RTC in 1 weeks for follow-up prenatal care

## 2023-03-23 NOTE — PROGRESS NOTES
Pt. Here for OB/FU. Reports Good FM.   Pt. Denies VB, LOF, or UC's.   Pt states she is starting to have some contractions. Also has had an increase in watery dx.   GBS +positive.

## 2023-03-24 ENCOUNTER — HOSPITAL ENCOUNTER (EMERGENCY)
Facility: MEDICAL CENTER | Age: 28
End: 2023-03-24
Attending: OBSTETRICS & GYNECOLOGY | Admitting: OBSTETRICS & GYNECOLOGY
Payer: MEDICAID

## 2023-03-24 VITALS
RESPIRATION RATE: 16 BRPM | SYSTOLIC BLOOD PRESSURE: 125 MMHG | HEIGHT: 61 IN | DIASTOLIC BLOOD PRESSURE: 68 MMHG | TEMPERATURE: 97.2 F | WEIGHT: 137 LBS | HEART RATE: 88 BPM | BODY MASS INDEX: 25.86 KG/M2

## 2023-03-24 PROCEDURE — 59025 FETAL NON-STRESS TEST: CPT | Mod: 26

## 2023-03-24 PROCEDURE — 99282 EMERGENCY DEPT VISIT SF MDM: CPT

## 2023-03-24 PROCEDURE — 99283 EMERGENCY DEPT VISIT LOW MDM: CPT | Mod: 25

## 2023-03-24 PROCEDURE — 59025 FETAL NON-STRESS TEST: CPT

## 2023-03-25 NOTE — PROGRESS NOTES
26yo, , edc4/8, pt presents with c/o contractions every 3-5 min. Pt denies LOF, vag bleeding. POS fm. EFM and Cambria placed. VSS.     SVE  Report to A Jesus  Orders received  SVE  Update to A Jesus  Orders received.  Discharge instructions given. Patient states understanding. Patient discharged in stable condition, ambulatory, with FOB.

## 2023-03-27 ENCOUNTER — ANESTHESIA (OUTPATIENT)
Dept: ANESTHESIOLOGY | Facility: MEDICAL CENTER | Age: 28
End: 2023-03-27
Payer: MEDICAID

## 2023-03-27 ENCOUNTER — TELEPHONE (OUTPATIENT)
Dept: OBGYN | Facility: CLINIC | Age: 28
End: 2023-03-27
Payer: MEDICAID

## 2023-03-27 ENCOUNTER — HOSPITAL ENCOUNTER (INPATIENT)
Facility: MEDICAL CENTER | Age: 28
LOS: 2 days | End: 2023-03-29
Attending: OBSTETRICS & GYNECOLOGY | Admitting: OBSTETRICS & GYNECOLOGY
Payer: MEDICAID

## 2023-03-27 ENCOUNTER — ANESTHESIA EVENT (OUTPATIENT)
Dept: ANESTHESIOLOGY | Facility: MEDICAL CENTER | Age: 28
End: 2023-03-27
Payer: MEDICAID

## 2023-03-27 LAB
BASOPHILS # BLD AUTO: 0.3 % (ref 0–1.8)
BASOPHILS # BLD: 0.03 K/UL (ref 0–0.12)
EOSINOPHIL # BLD AUTO: 0.06 K/UL (ref 0–0.51)
EOSINOPHIL NFR BLD: 0.5 % (ref 0–6.9)
ERYTHROCYTE [DISTWIDTH] IN BLOOD BY AUTOMATED COUNT: 45.2 FL (ref 35.9–50)
HCT VFR BLD AUTO: 38.6 % (ref 37–47)
HGB BLD-MCNC: 13.6 G/DL (ref 12–16)
HOLDING TUBE BB 8507: NORMAL
IMM GRANULOCYTES # BLD AUTO: 0.15 K/UL (ref 0–0.11)
IMM GRANULOCYTES NFR BLD AUTO: 1.3 % (ref 0–0.9)
LYMPHOCYTES # BLD AUTO: 1.92 K/UL (ref 1–4.8)
LYMPHOCYTES NFR BLD: 16.7 % (ref 22–41)
MCH RBC QN AUTO: 32.9 PG (ref 27–33)
MCHC RBC AUTO-ENTMCNC: 35.2 G/DL (ref 33.6–35)
MCV RBC AUTO: 93.2 FL (ref 81.4–97.8)
MONOCYTES # BLD AUTO: 0.74 K/UL (ref 0–0.85)
MONOCYTES NFR BLD AUTO: 6.5 % (ref 0–13.4)
NEUTROPHILS # BLD AUTO: 8.57 K/UL (ref 2–7.15)
NEUTROPHILS NFR BLD: 74.7 % (ref 44–72)
NRBC # BLD AUTO: 0 K/UL
NRBC BLD-RTO: 0 /100 WBC
PLATELET # BLD AUTO: 194 K/UL (ref 164–446)
PMV BLD AUTO: 10.8 FL (ref 9–12.9)
RBC # BLD AUTO: 4.14 M/UL (ref 4.2–5.4)
T PALLIDUM AB SER QL IA: NORMAL
WBC # BLD AUTO: 11.5 K/UL (ref 4.8–10.8)

## 2023-03-27 PROCEDURE — 10H07YZ INSERTION OF OTHER DEVICE INTO PRODUCTS OF CONCEPTION, VIA NATURAL OR ARTIFICIAL OPENING: ICD-10-PCS | Performed by: OBSTETRICS & GYNECOLOGY

## 2023-03-27 PROCEDURE — 87070 CULTURE OTHR SPECIMN AEROBIC: CPT

## 2023-03-27 PROCEDURE — 01967 NEURAXL LBR ANES VAG DLVR: CPT | Performed by: ANESTHESIOLOGY

## 2023-03-27 PROCEDURE — 700111 HCHG RX REV CODE 636 W/ 250 OVERRIDE (IP): Performed by: ANESTHESIOLOGY

## 2023-03-27 PROCEDURE — 700102 HCHG RX REV CODE 250 W/ 637 OVERRIDE(OP): Performed by: ADVANCED PRACTICE MIDWIFE

## 2023-03-27 PROCEDURE — 700111 HCHG RX REV CODE 636 W/ 250 OVERRIDE (IP)

## 2023-03-27 PROCEDURE — 59400 OBSTETRICAL CARE: CPT | Performed by: OBSTETRICS & GYNECOLOGY

## 2023-03-27 PROCEDURE — A9270 NON-COVERED ITEM OR SERVICE: HCPCS | Performed by: ADVANCED PRACTICE MIDWIFE

## 2023-03-27 PROCEDURE — 700111 HCHG RX REV CODE 636 W/ 250 OVERRIDE (IP): Performed by: OBSTETRICS & GYNECOLOGY

## 2023-03-27 PROCEDURE — 700105 HCHG RX REV CODE 258: Performed by: OBSTETRICS & GYNECOLOGY

## 2023-03-27 PROCEDURE — 10907ZC DRAINAGE OF AMNIOTIC FLUID, THERAPEUTIC FROM PRODUCTS OF CONCEPTION, VIA NATURAL OR ARTIFICIAL OPENING: ICD-10-PCS | Performed by: OBSTETRICS & GYNECOLOGY

## 2023-03-27 PROCEDURE — 85025 COMPLETE CBC W/AUTO DIFF WBC: CPT

## 2023-03-27 PROCEDURE — 87076 CULTURE ANAEROBE IDENT EACH: CPT

## 2023-03-27 PROCEDURE — 86780 TREPONEMA PALLIDUM: CPT

## 2023-03-27 PROCEDURE — 770002 HCHG ROOM/CARE - OB PRIVATE (112)

## 2023-03-27 PROCEDURE — 700101 HCHG RX REV CODE 250: Performed by: OBSTETRICS & GYNECOLOGY

## 2023-03-27 PROCEDURE — 36415 COLL VENOUS BLD VENIPUNCTURE: CPT

## 2023-03-27 PROCEDURE — 700105 HCHG RX REV CODE 258: Performed by: ANESTHESIOLOGY

## 2023-03-27 PROCEDURE — 59409 OBSTETRICAL CARE: CPT

## 2023-03-27 PROCEDURE — 87205 SMEAR GRAM STAIN: CPT

## 2023-03-27 PROCEDURE — 303615 HCHG EPIDURAL/SPINAL ANESTHESIA FOR LABOR

## 2023-03-27 PROCEDURE — 700101 HCHG RX REV CODE 250: Performed by: ANESTHESIOLOGY

## 2023-03-27 PROCEDURE — 304965 HCHG RECOVERY SERVICES

## 2023-03-27 RX ORDER — ONDANSETRON 4 MG/1
4 TABLET, ORALLY DISINTEGRATING ORAL EVERY 6 HOURS PRN
Status: DISCONTINUED | OUTPATIENT
Start: 2023-03-27 | End: 2023-03-28 | Stop reason: HOSPADM

## 2023-03-27 RX ORDER — EPHEDRINE SULFATE 50 MG/ML
5 INJECTION, SOLUTION INTRAVENOUS
Status: DISCONTINUED | OUTPATIENT
Start: 2023-03-27 | End: 2023-03-28 | Stop reason: HOSPADM

## 2023-03-27 RX ORDER — OXYTOCIN 10 [USP'U]/ML
10 INJECTION, SOLUTION INTRAMUSCULAR; INTRAVENOUS
Status: DISCONTINUED | OUTPATIENT
Start: 2023-03-27 | End: 2023-03-28 | Stop reason: HOSPADM

## 2023-03-27 RX ORDER — IBUPROFEN 800 MG/1
800 TABLET ORAL
Status: DISCONTINUED | OUTPATIENT
Start: 2023-03-27 | End: 2023-03-28 | Stop reason: HOSPADM

## 2023-03-27 RX ORDER — SODIUM CHLORIDE, SODIUM LACTATE, POTASSIUM CHLORIDE, AND CALCIUM CHLORIDE .6; .31; .03; .02 G/100ML; G/100ML; G/100ML; G/100ML
250 INJECTION, SOLUTION INTRAVENOUS PRN
Status: DISCONTINUED | OUTPATIENT
Start: 2023-03-27 | End: 2023-03-28 | Stop reason: HOSPADM

## 2023-03-27 RX ORDER — ONDANSETRON 2 MG/ML
4 INJECTION INTRAMUSCULAR; INTRAVENOUS EVERY 6 HOURS PRN
Status: DISCONTINUED | OUTPATIENT
Start: 2023-03-27 | End: 2023-03-28 | Stop reason: HOSPADM

## 2023-03-27 RX ORDER — SODIUM CHLORIDE, SODIUM LACTATE, POTASSIUM CHLORIDE, AND CALCIUM CHLORIDE .6; .31; .03; .02 G/100ML; G/100ML; G/100ML; G/100ML
1000 INJECTION, SOLUTION INTRAVENOUS
Status: DISCONTINUED | OUTPATIENT
Start: 2023-03-27 | End: 2023-03-28 | Stop reason: HOSPADM

## 2023-03-27 RX ORDER — ROPIVACAINE HYDROCHLORIDE 2 MG/ML
INJECTION, SOLUTION EPIDURAL; INFILTRATION; PERINEURAL CONTINUOUS
Status: DISCONTINUED | OUTPATIENT
Start: 2023-03-27 | End: 2023-03-29 | Stop reason: HOSPADM

## 2023-03-27 RX ORDER — ROPIVACAINE HYDROCHLORIDE 2 MG/ML
INJECTION, SOLUTION EPIDURAL; INFILTRATION
Status: DISCONTINUED | OUTPATIENT
Start: 2023-03-27 | End: 2023-03-27 | Stop reason: SURG

## 2023-03-27 RX ORDER — ACETAMINOPHEN 500 MG
1000 TABLET ORAL
Status: DISCONTINUED | OUTPATIENT
Start: 2023-03-27 | End: 2023-03-28 | Stop reason: HOSPADM

## 2023-03-27 RX ORDER — LIDOCAINE HYDROCHLORIDE AND EPINEPHRINE 15; 5 MG/ML; UG/ML
INJECTION, SOLUTION EPIDURAL
Status: DISCONTINUED | OUTPATIENT
Start: 2023-03-27 | End: 2023-03-30

## 2023-03-27 RX ORDER — SODIUM CHLORIDE, SODIUM LACTATE, POTASSIUM CHLORIDE, CALCIUM CHLORIDE 600; 310; 30; 20 MG/100ML; MG/100ML; MG/100ML; MG/100ML
INJECTION, SOLUTION INTRAVENOUS CONTINUOUS
Status: DISCONTINUED | OUTPATIENT
Start: 2023-03-27 | End: 2023-03-29 | Stop reason: HOSPADM

## 2023-03-27 RX ORDER — LIDOCAINE HYDROCHLORIDE 10 MG/ML
20 INJECTION, SOLUTION INFILTRATION; PERINEURAL
Status: DISCONTINUED | OUTPATIENT
Start: 2023-03-27 | End: 2023-03-28 | Stop reason: HOSPADM

## 2023-03-27 RX ORDER — TERBUTALINE SULFATE 1 MG/ML
0.25 INJECTION, SOLUTION SUBCUTANEOUS
Status: DISCONTINUED | OUTPATIENT
Start: 2023-03-27 | End: 2023-03-28 | Stop reason: HOSPADM

## 2023-03-27 RX ORDER — LIDOCAINE HYDROCHLORIDE AND EPINEPHRINE 15; 5 MG/ML; UG/ML
INJECTION, SOLUTION EPIDURAL
Status: DISCONTINUED | OUTPATIENT
Start: 2023-03-27 | End: 2023-03-27 | Stop reason: SURG

## 2023-03-27 RX ORDER — ROPIVACAINE HYDROCHLORIDE 2 MG/ML
INJECTION, SOLUTION EPIDURAL; INFILTRATION
Status: DISCONTINUED | OUTPATIENT
Start: 2023-03-27 | End: 2023-03-30

## 2023-03-27 RX ORDER — ROPIVACAINE HYDROCHLORIDE 2 MG/ML
INJECTION, SOLUTION EPIDURAL; INFILTRATION; PERINEURAL
Status: COMPLETED
Start: 2023-03-27 | End: 2023-03-27

## 2023-03-27 RX ADMIN — SODIUM CHLORIDE 5 MILLION UNITS: 900 INJECTION INTRAVENOUS at 18:23

## 2023-03-27 RX ADMIN — OXYTOCIN 2 MILLI-UNITS/MIN: 10 INJECTION, SOLUTION INTRAMUSCULAR; INTRAVENOUS at 21:23

## 2023-03-27 RX ADMIN — LIDOCAINE HYDROCHLORIDE AND EPINEPHRINE 5 ML: 15; 5 INJECTION, SOLUTION EPIDURAL at 17:05

## 2023-03-27 RX ADMIN — SERTRALINE 50 MG: 50 TABLET, FILM COATED ORAL at 22:06

## 2023-03-27 RX ADMIN — SODIUM CHLORIDE, POTASSIUM CHLORIDE, SODIUM LACTATE AND CALCIUM CHLORIDE 250 ML: 600; 310; 30; 20 INJECTION, SOLUTION INTRAVENOUS at 19:25

## 2023-03-27 RX ADMIN — ROPIVACAINE HYDROCHLORIDE 200 MG: 2 INJECTION, SOLUTION EPIDURAL; INFILTRATION at 17:46

## 2023-03-27 RX ADMIN — OXYTOCIN 20 UNITS: 10 INJECTION, SOLUTION INTRAMUSCULAR; INTRAVENOUS at 22:46

## 2023-03-27 RX ADMIN — SODIUM CHLORIDE, POTASSIUM CHLORIDE, SODIUM LACTATE AND CALCIUM CHLORIDE: 600; 310; 30; 20 INJECTION, SOLUTION INTRAVENOUS at 18:20

## 2023-03-27 RX ADMIN — PENICILLIN G POTASSIUM 2.5 MILLION UNITS: 20000000 INJECTION, POWDER, FOR SOLUTION INTRAVENOUS at 21:39

## 2023-03-27 RX ADMIN — FENTANYL CITRATE 100 MCG: 50 INJECTION, SOLUTION INTRAMUSCULAR; INTRAVENOUS at 15:45

## 2023-03-27 RX ADMIN — ROPIVACAINE HYDROCHLORIDE 2 ML: 2 INJECTION, SOLUTION EPIDURAL; INFILTRATION at 17:05

## 2023-03-27 RX ADMIN — ROPIVACAINE HYDROCHLORIDE 200 MG: 2 INJECTION, SOLUTION EPIDURAL; INFILTRATION; PERINEURAL at 17:46

## 2023-03-27 RX ADMIN — SODIUM CHLORIDE, POTASSIUM CHLORIDE, SODIUM LACTATE AND CALCIUM CHLORIDE: 600; 310; 30; 20 INJECTION, SOLUTION INTRAVENOUS at 19:40

## 2023-03-27 RX ADMIN — OXYTOCIN 125 ML/HR: 10 INJECTION, SOLUTION INTRAMUSCULAR; INTRAVENOUS at 23:55

## 2023-03-27 ASSESSMENT — PAIN DESCRIPTION - PAIN TYPE
TYPE: ACUTE PAIN

## 2023-03-27 ASSESSMENT — LIFESTYLE VARIABLES: EVER_SMOKED: YES

## 2023-03-27 ASSESSMENT — PAIN SCALES - GENERAL: PAIN_LEVEL: 0

## 2023-03-27 NOTE — TELEPHONE ENCOUNTER
PT called in stating contractions 4-5 minutes apart and loss of mucus plug. Advised pt to head to labor and delivery. PT verbalized understanding.

## 2023-03-27 NOTE — PROGRESS NOTES
1500- Report received from April/Joselyn Morgan. Pt visually uncomfortable with UCs.     1530- Dr Valdez in OR case. Cheli MORGAN called to ask Dr Valdez if rechecking pt or pain management are an option. Pt is requesting epidural at this time. Okay to give Iv pain management and to recheck pt.     1540- Dr Dickens reviewed tracing and okay to give Iv pain management.     1545- Fentanyl given for comfort. Pt managing Ucs with better control. Will wait for MD to recheck pt.     1620- Dr Valdez at bedside. SVE at 4/80/-2. Orders to admit pt and get her an epidural.     1505- Dr Villaseñor at bedside. Epidural placed with no complications.

## 2023-03-27 NOTE — H&P
"History and Physical      Eleanor Centeno is a 27 y.o. female  at 38w2d who presents for painful contractions.     Subjective:   positive  For CTXS.   positive Feels pain   negative for LOF  negative for vaginal bleeding.   positive for fetal movement    ROS: Pertinent items are noted in HPI.    Past Medical History:   Diagnosis Date    Asthma     \"sports asthma\"    Chronic back pain     related to scolosis    Migraine     Rh negative state in antepartum period, first trimester 2022    Spina bifida occulta      Past Surgical History:   Procedure Laterality Date    INSERTION OR REMOVAL, IUD N/A 2021    Procedure: INSERTION OR REMOVAL,IUD - REMOVAL;  Surgeon: Valeriy Emery M.D.;  Location: SURGERY SAME DAY [facility];  Service: Obstetrics    DENTAL EXTRACTION(S)      [place] teeth    OK REMOVAL OF TONSILS,<13 Y/O       OB History    Para Term  AB Living   4 2 2   1 2   SAB IAB Ectopic Molar Multiple Live Births   1         2      # Outcome Date GA Lbr Sami/2nd Weight Sex Delivery Anes PTL Lv   4 Current            3 Term 17 39w0d  2.863 kg (6 lb 5 oz) F Vag-Spont  N RADHA   2 Term 10/01/15 39w0d  2.693 kg (5 lb 15 oz) F Vag-Spont  Y RADHA   1 SAB              Social History     Socioeconomic History    Marital status:      Spouse name: Not on file    Number of children: Not on file    Years of education: Not on file    Highest education level: Not on file   Occupational History    Not on file   Tobacco Use    Smoking status: Some Days     Packs/day: 0.50     Types: Cigarettes    Smokeless tobacco: Never    Tobacco comments:     Less than a pack a day.    Vaping Use    Vaping Use: Never used   Substance and Sexual Activity    Alcohol use: No    Drug use: No    Sexual activity: Yes     Partners: Male     Birth control/protection: None     Comment: Planned pregnancy.   Other Topics Concern    Not on file   Social History Narrative    Not on file     Social Determinants of " Health     Financial Resource Strain: Not on file   Food Insecurity: Not on file   Transportation Needs: Not on file   Physical Activity: Not on file   Stress: Not on file   Social Connections: Not on file   Intimate Partner Violence: Not on file   Housing Stability: Not on file     Allergies: Tape    Current Facility-Administered Medications:     fentaNYL (SUBLIMAZE) injection 100 mcg, 100 mcg, Intravenous, Q HOUR PRN, Xochilt Bullard D.O., 100 mcg at 03/27/23 1545    LR infusion, , Intravenous, Continuous, Xochilt Bullard D.O.    lidocaine (XYLOCAINE) 1%  injection, 20 mL, Subcutaneous, Once PRN, Xochilt Bullard D.O.    terbutaline (BRETHINE) injection 0.25 mg, 0.25 mg, Subcutaneous, Once PRN, Xochilt Bullard D.O.    oxytocin (Pitocin) infusion bolus (for post delivery), 20 Units, Intravenous, Once **FOLLOWED BY** oxytocin (Pitocin) infusion (for post delivery), 125 mL/hr, Intravenous, Continuous, Xochilt Bullard D.O.    oxytocin (PITOCIN) injection 10 Units, 10 Units, Intramuscular, Once PRN, Xochilt Bullard D.O.    ibuprofen (MOTRIN) tablet 800 mg, 800 mg, Oral, Once PRN, Xochilt Bullard D.O.    acetaminophen (TYLENOL) tablet 1,000 mg, 1,000 mg, Oral, Once PRN, JOEL McdonaldOIan    oxytocin (Pitocin) infusion (for induction), 0.5-20 earle-units/min, Intravenous, Continuous, RAFA [email or web address].[email or web address]    penicillin G potassium 5 Million Units in  mL IVPB, 5 Million Units, Intravenous, Once **FOLLOWED BY** penicillin G potassium 2.5 million units in  mL IVPB, 2.5 Million Units, Intravenous, Q4HRS, RAFA [email or web address].[email or web address]    ondansetron (ZOFRAN ODT) dispertab 4 mg, 4 mg, Oral, Q6HRS PRN **OR** ondansetron (ZOFRAN) syringe/vial injection 4 mg, 4 mg, Intravenous, Q6HRS PRN, Xochilt Bullard D.O.    [facility] care with [facility] starting at 8w4d with following problems:  Patient Active Problem List    Diagnosis Date Noted    Labor and delivery, indication for care 03/27/2023    IUGR (intrauterine  "growth restriction) affecting care of mother 03/23/2023    GBS (group B Streptococcus carrier), +RV culture, currently pregnant 03/15/2023    Supervision of normal pregnancy 09/28/2022    Rh negative state in antepartum period - Rhogam given 1/19/23 09/28/2022    Anxiety and depression - started on Zoloft 50mg 10/25 02/13/2019               Objective:      /72   Pulse 93   Resp 16   Ht 1.549 m (5' 1\")   Wt 62.1 kg (137 lb)   SpO2 98%     General:   no acute distress, alert, cooperative   Skin:   normal   HEENT:  PERRLA and EOMI   Lungs:   Normal effort   Heart:  No LE edema   Abdomen:   gravid, NT   EFW:  2800    Pelvis:  adequate with gynecoid pelvis   FHT:  130 BPM       Presentations: Cephalic   Cervix:     4/80/-2                          Lab Review  Lab:   Blood type: O     Recent Results (from the past 5880 hour(s))   POCT Pregnancy    Collection Time: 08/31/22  1:05 PM   Result Value Ref Range    POC Urine Pregnancy Test POS Negative    Internal Control Positive Positive     Internal Control Negative Negative    Chlamydia/GC, PCR (Genital/Anal swab)    Collection Time: 09/28/22 10:50 AM   Result Value Ref Range    C. trachomatis by PCR Negative Negative    N. gonorrhoeae by PCR Negative Negative    Source Genital    URINE DRUG SCREEN W/CONF (AR)    Collection Time: 10/13/22 11:31 AM   Result Value Ref Range    Urine Amphetamine-Methamphetam Negative Cutoff 300 ng/mL    Barbiturates Negative Cutoff 200 ng/mL    Benzodiazepines Negative Cutoff 200 ng/mL    Propoxyphene Negative Cutoff 300 ng/mL    Cocaine Metabolite Negative Cutoff 150 ng/mL    Methadone Negative Cutoff 150 ng/mL    Codeine-Morphine Negative Cutoff 300 ng/mL    Phencyclidine -Pcp Negative Cutoff 25 ng/mL    Cannabinoid Metab Negative Cutoff 20 ng/mL    Drug Comment Urine Drugs See Note    URINE CULTURE(NEW)    Collection Time: 10/13/22 11:31 AM    Specimen: Urine   Result Value Ref Range    Significant Indicator NEG     Source UR     " Site -     Culture Result Usual urogenital scooter <10,000 cfu/mL    PREG CNTR PRENATAL PN    Collection Time: 10/13/22 11:31 AM   Result Value Ref Range    Color Yellow     Character Clear     Specific Gravity 1.014 <1.035    Ph 7.0 5.0 - 8.0    Glucose Negative Negative mg/dL    Ketones Negative Negative mg/dL    Protein Negative Negative mg/dL    Bilirubin Negative Negative    Urobilinogen, Urine 0.2 Negative    Nitrite Negative Negative    Leukocyte Esterase Negative Negative    Occult Blood Negative Negative    Micro Urine Req see below     WBC 7.8 4.8 - 10.8 K/uL    RBC 4.07 (L) 4.20 - 5.40 M/uL    Hemoglobin 13.2 12.0 - 16.0 g/dL    Hematocrit 38.8 37.0 - 47.0 %    MCV 95.3 81.4 - 97.8 fL    MCH 32.4 27.0 - 33.0 pg    MCHC 34.0 33.6 - 35.0 g/dL    RDW 43.8 35.9 - 50.0 fL    Platelet Count 167 164 - 446 K/uL    MPV 12.7 9.0 - 12.9 fL    Neutrophils-Polys 75.80 (H) 44.00 - 72.00 %    Lymphocytes 17.80 (L) 22.00 - 41.00 %    Monocytes 4.90 0.00 - 13.40 %    Eosinophils 0.80 0.00 - 6.90 %    Basophils 0.30 0.00 - 1.80 %    Immature Granulocytes 0.40 0.00 - 0.90 %    Nucleated RBC 0.00 /100 WBC    Neutrophils (Absolute) 5.90 2.00 - 7.15 K/uL    Lymphs (Absolute) 1.38 1.00 - 4.80 K/uL    Monos (Absolute) 0.38 0.00 - 0.85 K/uL    Eos (Absolute) 0.06 0.00 - 0.51 K/uL    Baso (Absolute) 0.02 0.00 - 0.12 K/uL    Immature Granulocytes (abs) 0.03 0.00 - 0.11 K/uL    NRBC (Absolute) 0.00 K/uL    Hepatitis C Antibody Non-Reactive Non-Reactive    Hepatitis B Surface Antigen Non-Reactive Non-Reactive    Rubella IgG Antibody 297.00 IU/mL    Syphilis, Treponemal Qual Non-Reactive Non-Reactive   HEP C VIRUS ANTIBODY    Collection Time: 10/13/22 11:31 AM   Result Value Ref Range    Hepatitis C Antibody Non-Reactive Non-Reactive   HIV AG/AB COMBO ASSAY SCREENING    Collection Time: 10/13/22 11:31 AM   Result Value Ref Range    HIV Ag/Ab Combo Assay Non-Reactive Non Reactive   OP Prenatal Panel-Blood Bank    Collection Time:  10/13/22 11:31 AM   Result Value Ref Range    ABO Grouping Only O     Rh Grouping Only NEG     Antibody Screen Scrn NEG    AFP TETRA    Collection Time: 11/15/22 12:06 PM   Result Value Ref Range    AFP Value -Eia 80 ng/mL    AFP MOM Value 1.17     Ue3 Value 2.64 ng/mL    Ue3 Mom 1.18     Patient's hCG, 2nd Trimester 79060 IU/L    hCG MoM, 2nd Trimester 0.73     Lora Value -Eia 390 pg/mL    Lora Mom Value 1.51     Interpretation Screen Neg     M[age] TRES 27.3 yr    Maternal Weight 111.0 lbs.     Gest. Age on Collection Date 19 wks, 3 days     Gestational Age Based On Ultrasound     Multiple Pregnancy Em     Race Nonblack     Insulin Dependent Diabetes No     Smoking Yes     Family Hx NTD No     Family Hx of Aneuploidy No     Specimen See Note     EER Quad, Maternal Serum See Note    Chlamydia/GC, PCR (Urine)    Collection Time: 11/15/22 12:10 PM    Specimen: Genital   Result Value Ref Range    C. trachomatis by PCR Negative Negative    Gc By Dna Probe Negative Negative    Source Urine    URINALYSIS    Collection Time: 01/03/23  5:30 PM    Specimen: Urine, Clean Catch   Result Value Ref Range    Color Yellow     Character Clear     Specific Gravity 1.024 <1.035    Ph 6.0 5.0 - 8.0    Glucose 100 (A) Negative mg/dL    Ketones Trace (A) Negative mg/dL    Protein Negative Negative mg/dL    Bilirubin Negative Negative    Urobilinogen, Urine 1.0 Negative    Nitrite Negative Negative    Leukocyte Esterase Small (A) Negative    Occult Blood Negative Negative    Micro Urine Req Microscopic    URINE MICROSCOPIC (W/UA)    Collection Time: 01/03/23  5:30 PM   Result Value Ref Range    WBC 5-10 (A) /hpf    RBC 2-5 (A) /hpf    Bacteria Moderate (A) None /hpf    Epithelial Cells Moderate (A) /hpf    Hyaline Cast 0-2 /lpf   VAGINAL PATHOGENS DNA PANEL    Collection Time: 01/04/23 11:23 AM   Result Value Ref Range    Candida species DNA Probe POSITIVE (A) Negative    Trichamonas vaginalis DNA Probe Negative Negative     Gardnerella vaginalis DNA Probe Negative Negative   CBC WITHOUT DIFFERENTIAL    Collection Time: 01/11/23 11:28 AM   Result Value Ref Range    WBC 10.1 4.8 - 10.8 K/uL    RBC 3.60 (L) 4.20 - 5.40 M/uL    Hemoglobin 11.7 (L) 12.0 - 16.0 g/dL    Hematocrit 35.4 (L) 37.0 - 47.0 %    MCV 98.3 (H) 81.4 - 97.8 fL    MCH 32.5 27.0 - 33.0 pg    MCHC 33.1 (L) 33.6 - 35.0 g/dL    RDW 46.7 35.9 - 50.0 fL    Platelet Count 155 (L) 164 - 446 K/uL    MPV 11.9 9.0 - 12.9 fL   GLUCOSE 1HR GESTATIONAL    Collection Time: 01/11/23 11:28 AM   Result Value Ref Range    Glucose, Post Dose 133 70 - 139 mg/dL   T.PALLIDUM AB ALOK (SCREENING)    Collection Time: 01/11/23 11:28 AM   Result Value Ref Range    Syphilis, Treponemal Qual Non-Reactive Non-Reactive   ANTIBODY SCREEN    Collection Time: 01/11/23 11:28 AM   Result Value Ref Range    Antibody Screen Scrn NEG    FETAL FIBRONECTIN    Collection Time: 01/15/23 12:52 AM   Result Value Ref Range    Fetal Fibronectin Negative    GRP B STREP, BY PCR (BABIN BROTH)    Collection Time: 03/09/23  1:48 PM    Specimen: Genital   Result Value Ref Range    Strep Gp B DNA PCR POSITIVE (A) Negative   POCT Fetal Nonstress Test    Collection Time: 03/23/23  1:54 PM   Result Value Ref Range    NST Indications      NST Baseline      NST Uterine Activity      NST Acoustic Stimulation      NST Assessment      NST Action Necessary      NST Other Data      NST Return      NST Read By     CBC WITH DIFFERENTIAL    Collection Time: 03/27/23  2:40 PM   Result Value Ref Range    WBC 11.5 (H) 4.8 - 10.8 K/uL    RBC 4.14 (L) 4.20 - 5.40 M/uL    Hemoglobin 13.6 12.0 - 16.0 g/dL    Hematocrit 38.6 37.0 - 47.0 %    MCV 93.2 81.4 - 97.8 fL    MCH 32.9 27.0 - 33.0 pg    MCHC 35.2 (H) 33.6 - 35.0 g/dL    RDW 45.2 35.9 - 50.0 fL    Platelet Count 194 164 - 446 K/uL    MPV 10.8 9.0 - 12.9 fL    Neutrophils-Polys 74.70 (H) 44.00 - 72.00 %    Lymphocytes 16.70 (L) 22.00 - 41.00 %    Monocytes 6.50 0.00 - 13.40 %    Eosinophils  0.50 0.00 - 6.90 %    Basophils 0.30 0.00 - 1.80 %    Immature Granulocytes 1.30 (H) 0.00 - 0.90 %    Nucleated RBC 0.00 /100 WBC    Neutrophils (Absolute) 8.57 (H) 2.00 - 7.15 K/uL    Lymphs (Absolute) 1.92 1.00 - 4.80 K/uL    Monos (Absolute) 0.74 0.00 - 0.85 K/uL    Eos (Absolute) 0.06 0.00 - 0.51 K/uL    Baso (Absolute) 0.03 0.00 - 0.12 K/uL    Immature Granulocytes (abs) 0.15 (H) 0.00 - 0.11 K/uL    NRBC (Absolute) 0.00 K/uL   T.PALLIDUM AB ALOK (SCREENING)    Collection Time: 03/27/23  2:46 PM   Result Value Ref Range    Syphilis, Treponemal Qual Non-Reactive Non-Reactive        Assessment:   Eleanor Centeno at 38w2d  Labor status: cervical change over 2 hours- latent phase labor  Obstetrical history significant for   Patient Active Problem List    Diagnosis Date Noted    Labor and delivery, indication for care 03/27/2023    IUGR (intrauterine growth restriction) affecting care of mother 03/23/2023    GBS (group B Streptococcus carrier), +RV culture, currently pregnant 03/15/2023    Supervision of normal pregnancy 09/28/2022    Rh negative state in antepartum period - Rhogam given 1/19/23 09/28/2022    Anxiety and depression - started on Zoloft 50mg 10/25 02/13/2019   .      Plan:     Admit to L&D  GBS positive- PCN ppx  Changed from 3 cm to 4 cm and patient very uncomfortable with contractions requesting epidural. Admit with expectant management. Pitocin or AROM when indicated.   IUGR- last EFW 10.5% with normal dopplers done on 3/15/23.           [facility]

## 2023-03-28 LAB
ACTION RH IMMUNE GLOB 8505RHG: NORMAL
GRAM STN SPEC: NORMAL
IMMUNE ROSETTING TEST 8505FMH: NORMAL
NUMBER OF RH DOSES IND 8505RD: 1
SIGNIFICANT IND 70042: NORMAL
SITE SITE: NORMAL
SOURCE SOURCE: NORMAL

## 2023-03-28 PROCEDURE — 700102 HCHG RX REV CODE 250 W/ 637 OVERRIDE(OP): Performed by: OBSTETRICS & GYNECOLOGY

## 2023-03-28 PROCEDURE — A9270 NON-COVERED ITEM OR SERVICE: HCPCS | Performed by: OBSTETRICS & GYNECOLOGY

## 2023-03-28 PROCEDURE — 36415 COLL VENOUS BLD VENIPUNCTURE: CPT

## 2023-03-28 PROCEDURE — 700102 HCHG RX REV CODE 250 W/ 637 OVERRIDE(OP): Performed by: ADVANCED PRACTICE MIDWIFE

## 2023-03-28 PROCEDURE — A9270 NON-COVERED ITEM OR SERVICE: HCPCS | Performed by: ADVANCED PRACTICE MIDWIFE

## 2023-03-28 PROCEDURE — 85461 HEMOGLOBIN FETAL: CPT

## 2023-03-28 PROCEDURE — 770002 HCHG ROOM/CARE - OB PRIVATE (112)

## 2023-03-28 RX ORDER — IBUPROFEN 800 MG/1
800 TABLET ORAL EVERY 8 HOURS PRN
Status: DISCONTINUED | OUTPATIENT
Start: 2023-03-28 | End: 2023-03-29 | Stop reason: HOSPADM

## 2023-03-28 RX ORDER — DOCUSATE SODIUM 100 MG/1
100 CAPSULE, LIQUID FILLED ORAL 2 TIMES DAILY PRN
Status: DISCONTINUED | OUTPATIENT
Start: 2023-03-28 | End: 2023-03-29 | Stop reason: HOSPADM

## 2023-03-28 RX ORDER — ACETAMINOPHEN 500 MG
1000 TABLET ORAL EVERY 6 HOURS PRN
Status: DISCONTINUED | OUTPATIENT
Start: 2023-03-28 | End: 2023-03-29 | Stop reason: HOSPADM

## 2023-03-28 RX ORDER — SODIUM CHLORIDE, SODIUM LACTATE, POTASSIUM CHLORIDE, CALCIUM CHLORIDE 600; 310; 30; 20 MG/100ML; MG/100ML; MG/100ML; MG/100ML
INJECTION, SOLUTION INTRAVENOUS PRN
Status: DISCONTINUED | OUTPATIENT
Start: 2023-03-28 | End: 2023-03-29 | Stop reason: HOSPADM

## 2023-03-28 RX ADMIN — ACETAMINOPHEN 1000 MG: 500 TABLET, FILM COATED ORAL at 12:10

## 2023-03-28 RX ADMIN — ACETAMINOPHEN 1000 MG: 500 TABLET, FILM COATED ORAL at 04:33

## 2023-03-28 RX ADMIN — SERTRALINE 50 MG: 50 TABLET, FILM COATED ORAL at 21:00

## 2023-03-28 RX ADMIN — IBUPROFEN 800 MG: 800 TABLET, FILM COATED ORAL at 08:22

## 2023-03-28 ASSESSMENT — EDINBURGH POSTNATAL DEPRESSION SCALE (EPDS)
I HAVE BLAMED MYSELF UNNECESSARILY WHEN THINGS WENT WRONG: NOT VERY OFTEN
I HAVE FELT SCARED OR PANICKY FOR NO GOOD REASON: NO, NOT MUCH
I HAVE BEEN SO UNHAPPY THAT I HAVE HAD DIFFICULTY SLEEPING: NOT AT ALL
I HAVE LOOKED FORWARD WITH ENJOYMENT TO THINGS: AS MUCH AS I EVER DID
THINGS HAVE BEEN GETTING ON TOP OF ME: NO, I HAVE BEEN COPING AS WELL AS EVER
I HAVE BEEN ABLE TO LAUGH AND SEE THE FUNNY SIDE OF THINGS: AS MUCH AS I ALWAYS COULD
I HAVE BEEN SO UNHAPPY THAT I HAVE BEEN CRYING: NO, NEVER
I HAVE FELT SAD OR MISERABLE: NO, NOT AT ALL
THE THOUGHT OF HARMING MYSELF HAS OCCURRED TO ME: NEVER
I HAVE BEEN ANXIOUS OR WORRIED FOR NO GOOD REASON: HARDLY EVER

## 2023-03-28 ASSESSMENT — PAIN DESCRIPTION - PAIN TYPE
TYPE: ACUTE PAIN

## 2023-03-28 NOTE — ANESTHESIA TIME REPORT
Anesthesia Start and Stop Event Times     Date Time Event    3/27/2023 1705 Ready for Procedure     1705 Anesthesia Start     2242 Anesthesia Stop        Responsible Staff  03/27/23    Name Role Begin End    Santosh Villaseñor M.D. Anesth 1705 2242        Overtime Reason:  no overtime (within assigned shift)    Comments:

## 2023-03-28 NOTE — DISCHARGE PLANNING
Discharge Planning Assessment Post Partum    Reason for Referral: Hx of anxiety and depression   Address:  [address]   Type of Living Situation:Stable   Mom Diagnosis: Postpartum  Baby Diagnosis: [place]   Primary Language: English     Name of Baby: Rene Esquivel  Father of the Baby: Gurpreet Esquivel  Involved in baby’s care? Yes  Contact Information: [phone]    Prenatal Care: Yes  Mom's PCP: None  PCP for new baby:Dr Graves     Support System: Yes  Coping/Bonding between mother & baby: MOB coping/bonding   Source of Feeding: Breast  Supplies for Infant: Yes    Mom's Insurance: Medicaid   Baby Covered on Insurance:Yes  Mother Employed/School: None  Other children in the home/names & ages: 7 yr old Mikael and 5 yr old Stephani    Financial Hardship/Income: None   Mom's Mental status: Stable and alert   Services used prior to admit: None    CPS History: None  Psychiatric History: Hx of anxiety and depression on Zoloft   Domestic Violence History: None  Drug/ETOH History: None    Resources Provided:  provided postpartum depression resources   Referrals Made: None     Clearance for Discharge: Baby is cleared to discharge with MOB and FOB when medically cleared

## 2023-03-28 NOTE — CARE PLAN
Problem: Knowledge Deficit - L&D  Goal: Patient and family/caregivers will demonstrate understanding of plan of care, disease process/condition, diagnostic tests and medications  Outcome: Progressing  Note: Patient will show understanding of the labor process.      Problem: Psychosocial  Goal: Patient's level of anxiety will decrease  Flowsheets (Taken 3/27/2023 1849)  Decrease Anxiety Level: Collaborated with patient to identify and develop coping strategies  Patient Behaviors: Anxious  Note: Patient will demonstrate coping techniques such as deep breathing    The patient is Stable - Low risk of patient condition declining or worsening    Shift Goals  Clinical Goals: Establish healthy contraction pattern  Patient Goals: Have a safe and painless delivery.  Family Goals: Healthy mom, healthy baby.    Progress made toward(s) clinical / shift goals:  Progressing.     Patient is not progressing towards the following goals:

## 2023-03-28 NOTE — PROGRESS NOTES
S: Pt is laying in bed s/p epidural placement. Discussed AROM and IUPC placement if appropriate. Family at bedside.      O:    Vitals:    03/27/23 2247 03/27/23 2302 03/27/23 2318 03/27/23 2332   BP: 117/64 106/57 108/75 114/80   Pulse: 89 96 (!) 101 (!) 108   Resp:       Temp:       TempSrc:       SpO2:       Weight:       Height:               FHTs:  Baseline 140, pos accels, no decels, moderate variability        [place]: Contractions q 4-7 minutes, mild to palpation        SVE: 7/80/-2 AROM completed with moderate amount of meconium stained fluid returned.     A/P:    1.  IUP @ 38w3d   2.  Cat I FHTs    3.  Active Labor - after IUPC placement, contraction noted to have low MVUs. Consider pitocin initiation.   4.  Meconium Stained Fluid- discussed additional personnel at delivery related to this.   5. GBS positive- continue Abx per protocol.    ALDAIR Bauman, CNM

## 2023-03-28 NOTE — ANESTHESIA PROCEDURE NOTES
Epidural Block    Date/Time: 3/27/2023 5:05 PM  Performed by: Santosh Villaseñor M.D.  Authorized by: Santosh Villaseñor M.D.     Patient Location:  OB  Start Time:  3/27/2023 5:05 PM  End Time:  3/27/2023 5:30 PM  Reason for Block: labor analgesia    patient identified, IV checked, site marked, risks and benefits discussed, surgical consent, monitors and equipment checked, pre-op evaluation and timeout performed    Patient Position:  Sitting  Prep: ChloraPrep, patient draped and sterile technique    Monitoring:  Blood pressure, continuous pulse oximetry and heart rate  Approach:  Midline  Location:  L3-L4  Injection Technique:  MARLENE saline  Skin infiltration:  Lidocaine  Strength:  1%  Dose:  3ml  Needle Type:  Tuohy  Needle Gauge:  17 G  Needle Length:  3.5 in  Loss of resistance::  6  Catheter Size:  19 G  Catheter at Skin Depth:  10  Test Dose Result:  Negative   26 g pencil point to csf. 2 ml rpoiv injected

## 2023-03-28 NOTE — L&D DELIVERY NOTE
Vaginal Delivery Procedure Note:    Eleanor Centeno is a 27 y.o. , now Para 3 admitted for active labor.  Her labor course was unremarkable and patient progressed to complete shortly after AROM. Episiotomy was performed secondary to non reassuring fetal heart tones.     PreDelivery Diagnosis:  1. SIUP @ 38w2d    PostDelivery Diagnosis:  1. S/p       Procedure in Detail:  Patient began pushing in the dorsal lithotomy position. After midline episiotomy was performed, the head delivered easily over an intact perineum in the IVAN position.  The anterior shoulder followed easily with gentle downwards pressure followed by the posterior shoulder and body.  There was no nuchal cord.  Infant was placed on the maternal abdomen and was stimulated and bulb suctioned.  Cord clamping was delayed x60 seconds then the cord was clamped x2 and cut.  Infant APGARs 8 and 9 and 1 and 5 minutes, respectively.  Placenta delivered spontaneously intact with 3 vessel cord.  The vagina and perineum were examined revealing a midline episiotomy with no extension which was repaired in standard fashion with 3-0 vicryl with resulting hemostasis noted.  The uterus was firm with IV pitocin and fundal massage.  Patient and infant left bonding in LDR.     cc  Anesthesia - epidural  Sponge and needle counts correct.  Patient tolerated procedure well.    Anticipate routine postparum care.    Xochilt Bullard D.O.  [facility], Women[facility]

## 2023-03-28 NOTE — PROGRESS NOTES
1900 Report received from EDDIE Alves. Pt not currently having pain relief from epidural, pt and previous nurse stated bolus button had been pushed prior to report, will reassess. Per report, possible rupture around 1800.      MD Kasi at bedside to assess pt pain, administered bolus, pt tolerated well.      Pt stating feeling pain relief from contractions.      Delivery of viable baby boy, APGAR's 8/9, EBL: 250.    0140 Pt attempted to ambulate, unable to lift left leg, pt back to bed.     0210 Pt ambulated to bathroom with staggering gait, julieta care provided. Ambulated to wheelchair.    0230 Pt transferred to PPU in stable condition with baby in arms, FOB and belongings. Report given to EDDIE Webster. Bands verified.

## 2023-03-28 NOTE — ANESTHESIA POSTPROCEDURE EVALUATION
Patient: Eleanor Centeno    Procedure Summary     Date: 03/27/23 Room / Location:     Anesthesia Start: 1705 Anesthesia Stop: 2242    Procedure: Labor Epidural Diagnosis:     Scheduled Providers:  Responsible Provider: Santosh Villaseñor M.D.    Anesthesia Type: epidural ASA Status: 2          Final Anesthesia Type: epidural  Last vitals  BP   Blood Pressure: 110/59    Temp   36.2 °C (97.2 °F)    Pulse   88   Resp   16    SpO2   96 %      Anesthesia Post Evaluation    Patient location during evaluation: PACU  Patient participation: complete - patient participated  Level of consciousness: awake and alert  Pain score: 0    Airway patency: patent  Anesthetic complications: no  Cardiovascular status: hemodynamically stable  Respiratory status: acceptable  Hydration status: euvolemic    PONV: none          No notable events documented.     Nurse Pain Score: 5 (NPRS)

## 2023-03-28 NOTE — CARE PLAN
Problem: Knowledge Deficit - Postpartum  Goal: Patient will verbalize and demonstrate understanding of self and infant care  Outcome: Progressing     Problem: Psychosocial - Postpartum  Goal: Patient will verbalize and demonstrate effective bonding and parenting behavior  Outcome: Progressing     Problem: Altered Physiologic Condition  Goal: Patient physiologically stable as evidenced by normal lochia, palpable uterine involution and vitals within normal limits  Outcome: Progressing   The patient is Stable - Low risk of patient condition declining or worsening    Shift Goals  Clinical Goals: vss, bond with baby  Patient Goals: rest  Family Goals: infant assist and support    Progress made toward(s) clinical / shift goals:  pain controlled with tylenol & motrin, bonding with baby    Patient is not progressing towards the following goals:

## 2023-03-28 NOTE — PROGRESS NOTES
Shift Goals  Clinical Goals: vitals stable, fundus firm  Patient Goals: rest, breastfeed  Family Goals: infant assist and support    Progress made toward(s) clinical / shift goals:  Eleanor has stable vitals, fundus is firm with scant lochia. She delivered her third baby vaginally and is doing well. Her pain is good currently as her epidural has not fully worn off. She was oriented to room and call light, as well as emergency call procedures.  Her  and infant are by her side.

## 2023-03-28 NOTE — CARE PLAN
Problem: Psychosocial  Goal: Patient's level of anxiety will decrease  Outcome: Progressing     Problem: Pain - Standard  Goal: Alleviation of pain or a reduction in pain to the patient’s comfort goal  Outcome: Progressing     Problem: Knowledge Deficit - Postpartum  Goal: Patient will verbalize and demonstrate understanding of self and infant care  Outcome: Progressing     Problem: Altered Physiologic Condition  Goal: Patient physiologically stable as evidenced by normal lochia, palpable uterine involution and vitals within normal limits  Outcome: Progressing   The patient is Stable - Low risk of patient condition declining or worsening    Shift Goals  Clinical Goals: vitals stable, fundus firm  Patient Goals: rest, breastfeed  Family Goals: infant assist and support    Progress made toward(s) clinical / shift goals:  Eleanor has stable vitals, fundus is firm with scant lochia. She delivered her third baby vaginally and is doing well. Her pain is good currently as her epidural has not fully worn off. She was oriented to room and call light, as well as emergency call procedures.  Her  and infant are by her side.      Patient is not progressing towards the following goals:

## 2023-03-28 NOTE — ANESTHESIA PROCEDURE NOTES
Epidural Block    Date/Time: 3/27/2023 5:05 PM  Performed by: Santosh Villaseñor M.D.  Authorized by: Santosh Villaseñor M.D.     Start Time:  3/27/2023 5:05 PM  End Time:  3/27/2023 5:35 PM  Location:  L3-L4  Injection Technique:  MARLENE saline  Loss of resistance::  5  Catheter at Skin Depth:  9  Test Dose Result:  Negative   Two previous epiderals. 2 previous spinal taps as a child.    26 g pencil point to csf. 2 ml ropiv. Injected.    Some difficulty threading epideral catheter.

## 2023-03-28 NOTE — ANESTHESIA PREPROCEDURE EVALUATION
Date: 03/27/23  Procedure: Labor Epidural         Relevant Problems   No relevant active problems       Physical Exam    Airway   Mallampati: II  TM distance: >3 FB  Neck ROM: full       Cardiovascular - normal exam  Rhythm: regular  Rate: normal  (-) murmur     Dental - normal exam           Pulmonary - normal exam  Breath sounds clear to auscultation     Abdominal    Neurological - normal exam                 Anesthesia Plan    ASA 2       Plan - epidural   Neuraxial block will be labor analgesia                  Pertinent diagnostic labs and testing reviewed    Informed Consent:    Anesthetic plan and risks discussed with patient.      spina difida

## 2023-03-28 NOTE — PROGRESS NOTES
Obstetrics & Gynecology Post-Delivery Progress Note    Date of Service      27 y.o.  s/p vaginal, spontaneous  Delivery date: 2023    Events  No events    Subjective  Pain: No  Bleeding: lochia minimal  PO's: taking regular diet  Voiding: without difficulty  Ambulating: yes  Passing flatus: No  Feeding: breastfeeding well    Objective  Temp:  [36.2 °C (97.2 °F)-36.4 °C (97.5 °F)] 36.2 °C (97.2 °F)  Pulse:  [] 89  Resp:  [16-18] 18  BP: ()/(50-90) 109/74  SpO2:  [93 %-98 %] 97 %    Physical Exam  General: well and resting  Chest/Breasts: nipples intact   Abdomen: no masses, nontender, normal bowel sounds  Fundus: firm and at umbilicus  Incision: not applicable, (vaginal delivery)  Perineum: deferred  Extremities: symmetric, calves nontender    Recent Labs     23  1440   WBC 11.5*   RBC 4.14*   HEMOGLOBIN 13.6   HEMATOCRIT 38.6   MCV 93.2   MCH 32.9   RDW 45.2   PLATELETCT 194   MPV 10.8   NEUTSPOLYS 74.70*   LYMPHOCYTES 16.70*   MONOCYTES 6.50   EOSINOPHILS 0.50   BASOPHILS 0.30       Assessment/Plan  Eleanor Centeno is a 27 y.o.yo  s/p postpartum day #1  s/p vaginal, spontaneous    1. Post care: meeting all goals  2. Hemodynamics: awaiting CBC  3. Pain: controlled  4. Rh-, Rubella Immune  5. Method of Feeding: plans to breastfeed  6. Method of Contraception:  not assessed  7. Disposition: likely home postpartum day 2    VTE prophylaxis: none indicated

## 2023-03-29 ENCOUNTER — PHARMACY VISIT (OUTPATIENT)
Dept: PHARMACY | Facility: MEDICAL CENTER | Age: 28
End: 2023-03-29
Payer: COMMERCIAL

## 2023-03-29 VITALS
DIASTOLIC BLOOD PRESSURE: 58 MMHG | RESPIRATION RATE: 18 BRPM | OXYGEN SATURATION: 97 % | WEIGHT: 137 LBS | HEART RATE: 71 BPM | BODY MASS INDEX: 25.86 KG/M2 | SYSTOLIC BLOOD PRESSURE: 93 MMHG | HEIGHT: 61 IN | TEMPERATURE: 97.6 F

## 2023-03-29 LAB
ERYTHROCYTE [DISTWIDTH] IN BLOOD BY AUTOMATED COUNT: 46.9 FL (ref 35.9–50)
HCT VFR BLD AUTO: 30.8 % (ref 37–47)
HGB BLD-MCNC: 10.7 G/DL (ref 12–16)
MCH RBC QN AUTO: 32.9 PG (ref 27–33)
MCHC RBC AUTO-ENTMCNC: 34.7 G/DL (ref 33.6–35)
MCV RBC AUTO: 94.8 FL (ref 81.4–97.8)
PLATELET # BLD AUTO: 151 K/UL (ref 164–446)
PMV BLD AUTO: 10.9 FL (ref 9–12.9)
RBC # BLD AUTO: 3.25 M/UL (ref 4.2–5.4)
WBC # BLD AUTO: 10.1 K/UL (ref 4.8–10.8)

## 2023-03-29 PROCEDURE — 36415 COLL VENOUS BLD VENIPUNCTURE: CPT

## 2023-03-29 PROCEDURE — RXMED WILLOW AMBULATORY MEDICATION CHARGE: Performed by: BEHAVIOR ANALYST

## 2023-03-29 PROCEDURE — 85027 COMPLETE CBC AUTOMATED: CPT

## 2023-03-29 RX ORDER — ACETAMINOPHEN 500 MG
1000 TABLET ORAL EVERY 6 HOURS PRN
Qty: 30 TABLET | Refills: 0 | Status: SHIPPED | OUTPATIENT
Start: 2023-03-29 | End: 2023-05-02

## 2023-03-29 RX ORDER — IBUPROFEN 800 MG/1
800 TABLET ORAL EVERY 8 HOURS PRN
Qty: 30 TABLET | Refills: 0 | Status: SHIPPED | OUTPATIENT
Start: 2023-03-29 | End: 2023-05-02

## 2023-03-29 ASSESSMENT — PAIN DESCRIPTION - PAIN TYPE
TYPE: ACUTE PAIN

## 2023-03-29 NOTE — PROGRESS NOTES
0950: Assumed care of patient, assessment completed. Fundus is firm at two below umbilicus with scant lochia rubra. Patient reports 0/10 pain at this time. Plan of care discussed, patient verbalized understanding. Personal belongings and call light within reach.

## 2023-03-29 NOTE — PROGRESS NOTES
1900- Bedside report received. VSS.   2035- Patient assessment completed. Reviewed POC for the night and answered all questions. Call light within reach.

## 2023-03-29 NOTE — PROGRESS NOTES
1230: Discharge paperwork discussed with parents at bedside. All questions answered. Follow-up appointment to be made by patient. NBS #2 dates given to parents. Parents verbalize understanding. Paperwork signed and dated at this time.    1303: Infant discharged in car seat with parents. Infant placed in car seat by parents and checked by RN. Bands verified. Cuddles removed.

## 2023-03-29 NOTE — DISCHARGE SUMMARY
"  Discharge Summary:     Date of Admission: 3/27/2023  Date of Discharge: 23      Admitting diagnosis:    1. Pregnancy @ 38w2d        Discharge Diagnosis:   1. Status post vaginal, spontaneous.      Past Medical History:   Diagnosis Date    Asthma     \"sports asthma\"    Chronic back pain     related to scolosis    Migraine     Rh negative state in antepartum period, first trimester 2022    Spina bifida occulta      OB History    Para Term  AB Living   4 3 3   1 3   SAB IAB Ectopic Molar Multiple Live Births   1       0 3      # Outcome Date GA Lbr Sami/2nd Weight Sex Delivery Anes PTL Lv   4 Term 23 38w2d / 00:24 3.065 kg (6 lb 12.1 oz) M Vag-Spont EPI N RADHA   3 Term 17 39w0d  2.863 kg (6 lb 5 oz) F Vag-Spont  N RADHA   2 Term 10/01/15 39w0d  2.693 kg (5 lb 15 oz) F Vag-Spont  Y RADHA   1 SAB              Past Surgical History:   Procedure Laterality Date    INSERTION OR REMOVAL, IUD N/A 2021    Procedure: INSERTION OR REMOVAL,IUD - REMOVAL;  Surgeon: Valeriy Emery M.D.;  Location: SURGERY SAME DAY [facility];  Service: Obstetrics    DENTAL EXTRACTION(S)      [place] teeth    NE REMOVAL OF TONSILS,<13 Y/O       Tape    Patient Active Problem List   Diagnosis    Anxiety and depression - started on Zoloft 50mg 10/25    Supervision of normal pregnancy    Rh negative state in antepartum period - Rhogam given 23    GBS (group B Streptococcus carrier), +RV culture, currently pregnant    IUGR (intrauterine growth restriction) affecting care of mother    Labor and delivery, indication for care       Hospital Course:   Pt is a 27 y.o. now  who presented on 3/27/2023 for active labor. Single male infant was delivered via  at 2242. Apgars 8, 9 at 1 and 5 minutes respectively.  ml.     Pt reports minimal bleeding, minimal pain; stable for d/c.    Postpartum course notable for early ambulation, well managed pain, tolerance of diet, spontaneous voiding, and " appropriate feeding of infant. She has remained afebrile and blood pressure has been well controlled. All maternal questions and concerns addressed    Physical Exam:  Temp:  [36.2 °C (97.1 °F)-36.4 °C (97.6 °F)] 36.4 °C (97.6 °F)  Pulse:  [65-90] 71  Resp:  [16-18] 18  BP: ()/(56-58) 93/58  SpO2:  [96 %-97 %] 97 %  Physical Exam  General: well appearing, no apparent distress  Abdomen: soft, nontender, nondistended  Fundus: firm at level below umbilicus  Incision: n/a  Perineum: Deferred  Extremities: symmetric, no peripheral edema, calves nontender    Current Facility-Administered Medications   Medication Dose    lactated ringers infusion      docusate sodium (COLACE) capsule 100 mg  100 mg    ibuprofen (MOTRIN) tablet 800 mg  800 mg    acetaminophen (TYLENOL) tablet 1,000 mg  1,000 mg    PRN oxytocin (PITOCIN) (20 Units/1000 mL) PRN for excessive uterine bleeding - See Admin Instr  125-999 mL/hr    fentaNYL (SUBLIMAZE) injection 100 mcg  100 mcg    LR infusion      oxytocin (Pitocin) infusion (for post delivery)  125 mL/hr    oxytocin (Pitocin) infusion (for induction)  0.5-20 earle-units/min    ropivacaine 0.2 % (NAROPIN) injection      sertraline (Zoloft) tablet 50 mg  50 mg       Recent Labs     23  1440 23  0535   WBC 11.5* 10.1   RBC 4.14* 3.25*   HEMOGLOBIN 13.6 10.7*   HEMATOCRIT 38.6 30.8*   MCV 93.2 94.8   MCH 32.9 32.9   MCHC 35.2* 34.7   RDW 45.2 46.9   PLATELETCT 194 151*   MPV 10.8 10.9         Activity/ Discharge Instructions::   Discharge to home  Pelvic Rest x 6 weeks  No heavy lifting x4 weeks  Call or come to ED for: heavy vaginal bleeding, fever >100.4, severe abdominal pain, severe headache, chest pain, shortness of breath,  N/V, incisional drainage, or other concerns.       Follow up:  [facility] in 5 weeks for vaginal delivery; 1 week for incision check for  delivery.     Discharge Meds:   No current outpatient medications on file.           Germán FELDMAN  ROLAND Kwon.

## 2023-03-29 NOTE — LACTATION NOTE
This note was copied from a baby's chart.  Initial Lactation Visit:  Met with infant's mother, Eleanor. She reports that breast feeding has been going well. Infant has begun cluster feeding, and is latching well to the breast. Eleanor reports mild discomfort to nipples bilaterally, which she feels is residual from poor latches yesterday.     Breast assessment shows a very small area of abraded tissue on tip of right nipple. Assessment otherwise WNL. Infant placed to breast in football hold on left breast. Assisted in achieving a deep latch, Eleanor denies discomfort after assist. Infant vigorous at breast.    Breast feeding assistance and education provided: Education provided regarding the milk making process and supply and demand. Frequent skin to skin encouraged. Also encouraged Eleanor to offer the breast to baby any time he is showing hunger cues (cues reviewed) and advised mother not to limit baby's time at the breast. Anticipatory guidance provided regarding typical  feeding behaviors in the first 24-48hrs, including cluster feeding. Proper positioning and latch technique verbalized and demonstrated. Education provided regarding the importance of achieving a deep latch with each feeding to ensure proper stimulation, milk transfer, and reduce the chance for nipple damage/pain. Watch for stools to become yellow/green by day 5. Reviewed nipple care instructions (lanolin cream at bedside).    Encouraged MOB call for assistance from RN/lactation with any additional breastfeeding questions or concerns during hospitalization.      Plan: Continue with cue based feedings at least 8 times every 24 hrs. Offer both breasts at each feeding. Frequent skin to skin. Do not limit baby's time at the breast.     [facility] Breastfeeding Resources handout provided. MOB interested in enrolled with [facility], will meet with [facility] liaison today to determine eligibility.

## 2023-03-29 NOTE — CARE PLAN
The patient is Stable - Low risk of patient condition declining or worsening    Shift Goals  Clinical Goals: patient will remain clinically stable  Patient Goals:   Family Goals:     Progress made toward(s) clinical / shift goals: Patient able to verbalize pain and request for pain management if needed. Patient is currently receiving PRN medication if needed. Fundus palpable and firm at two below umbilicus with scant lochia rubra.    Patient is not progressing towards the following goals:

## 2023-03-29 NOTE — DISCHARGE INSTRUCTIONS

## 2023-03-29 NOTE — CARE PLAN
The patient is Stable - Low risk of patient condition declining or worsening    Shift Goals  Clinical Goals: Pain management  Patient Goals: rest  Family Goals: infant assist and support    Progress made toward(s) clinical / shift goals:  Patient states pain is managebable    Patient is not progressing towards the following goals:

## 2023-03-30 DIAGNOSIS — N64.52 NIPPLE DISCHARGE: ICD-10-CM

## 2023-03-30 LAB
BACTERIA WND AEROBE CULT: ABNORMAL
BACTERIA WND AEROBE CULT: ABNORMAL
GRAM STN SPEC: ABNORMAL
SIGNIFICANT IND 70042: ABNORMAL
SITE SITE: ABNORMAL
SOURCE SOURCE: ABNORMAL

## 2023-04-02 ENCOUNTER — PATIENT MESSAGE (OUTPATIENT)
Dept: OBGYN | Facility: CLINIC | Age: 28
End: 2023-04-02
Payer: MEDICAID

## 2023-05-02 ENCOUNTER — POST PARTUM (OUTPATIENT)
Dept: OBGYN | Facility: CLINIC | Age: 28
End: 2023-05-02
Payer: MEDICAID

## 2023-05-02 VITALS — BODY MASS INDEX: 23.43 KG/M2 | SYSTOLIC BLOOD PRESSURE: 82 MMHG | DIASTOLIC BLOOD PRESSURE: 64 MMHG | WEIGHT: 124 LBS

## 2023-05-02 PROCEDURE — 0503F POSTPARTUM CARE VISIT: CPT | Performed by: NURSE PRACTITIONER

## 2023-05-02 ASSESSMENT — EDINBURGH POSTNATAL DEPRESSION SCALE (EPDS)
I HAVE BEEN ABLE TO LAUGH AND SEE THE FUNNY SIDE OF THINGS: AS MUCH AS I ALWAYS COULD
THE THOUGHT OF HARMING MYSELF HAS OCCURRED TO ME: NEVER
TOTAL SCORE: 3
I HAVE FELT SAD OR MISERABLE: NO, NOT AT ALL
I HAVE BEEN ANXIOUS OR WORRIED FOR NO GOOD REASON: HARDLY EVER
THINGS HAVE BEEN GETTING ON TOP OF ME: NO, I HAVE BEEN COPING AS WELL AS EVER
I HAVE BEEN SO UNHAPPY THAT I HAVE HAD DIFFICULTY SLEEPING: NOT AT ALL
I HAVE BEEN SO UNHAPPY THAT I HAVE BEEN CRYING: NO, NEVER
I HAVE BLAMED MYSELF UNNECESSARILY WHEN THINGS WENT WRONG: NOT VERY OFTEN
I HAVE LOOKED FORWARD WITH ENJOYMENT TO THINGS: AS MUCH AS I EVER DID
I HAVE FELT SCARED OR PANICKY FOR NO GOOD REASON: NO, NOT MUCH

## 2023-05-02 ASSESSMENT — ENCOUNTER SYMPTOMS
CARDIOVASCULAR NEGATIVE: 1
EYES NEGATIVE: 1
CONSTITUTIONAL NEGATIVE: 1
NEUROLOGICAL NEGATIVE: 1
MUSCULOSKELETAL NEGATIVE: 1
RESPIRATORY NEGATIVE: 1
GASTROINTESTINAL NEGATIVE: 1
PSYCHIATRIC NEGATIVE: 1

## 2023-05-02 NOTE — PROGRESS NOTES
Subjective     Eleanor Centeno is a 27 y.o. female who presents with No chief complaint on file.            S   28 y/o now  s/p  on 3/27/23 of baby without complications. Midline episiotomy. Now 6 weeks postpartum. Prenatal course significant for depression/anxiety on zoloft, GBS positive, IGUR. Postpartum course without any complications. Feeling well and happy with baby, denies any severe mood swings or s/sx of postpartum depression and anxiety. She is continuing her 50mg of zoloft and feels well controlled.     Baby is doing well,  breastfeeding exclusively.  No issues with breastfeeding at this time.  Has not resumed sexual activity.  Mother reports no issues with bowel or bladder routine, continued regular diet. Bleeding since birth has subsided at this time with no return to menses. No vaginal pain/odor/itching, fever, headaches, dizziness/SOB or dysuria. Planning vasectomy for contraception. Reports her episiotomy has healed and feels great. She does have a small rash on her upper breasts/chest that started right before she went into labor and does not itch and has increased in size a bit but is no where else on her body. She has been trying to put sensitive lotion on it but this has not helped.     O  See PE: Physical exam today with no abnormal findings  Vital signs WNL: BP and weight as notated  PAP: NILM on     A  Reassuring exam of lactating postpartum woman s/p  on 3/27/23     P  - Condoms as bridge until partner gets vasectomy   - Plan for vitamin E or coconut oil on chest and hydrocortisone cream, if not improving in a few weeks will seek out derm consult   - Resumption of sexual activity: safe sex precautions given  - Counseling on nutrition, adequate hydration, and exercise   - Kegel Exercises for pelvic floor/prevention of urinary incontinence   - EPDS: 3  - List of mental health providers given to pt for further zoloft rx etc  - Continue PNV for breastfeeding mother  - Counseling  on PAP guidelines re: next PAP due 2024  - Warning s/sx of postpartum infection, depression, preeclampsia   - RTC PRN              Review of Systems   Constitutional: Negative.    HENT: Negative.     Eyes: Negative.    Respiratory: Negative.     Cardiovascular: Negative.    Gastrointestinal: Negative.    Genitourinary: Negative.    Musculoskeletal: Negative.    Skin: Negative.    Neurological: Negative.    Endo/Heme/Allergies: Negative.    Psychiatric/Behavioral: Negative.              Objective     BP (!) 82/64   Wt 124 lb   LMP 04/28/2022 (Exact Date)   BMI 23.43 kg/m²      Physical Exam  Constitutional:       Appearance: Normal appearance.   HENT:      Nose: Nose normal.   Cardiovascular:      Rate and Rhythm: Normal rate and regular rhythm.      Pulses: Normal pulses.      Heart sounds: Normal heart sounds.   Pulmonary:      Effort: Pulmonary effort is normal.      Breath sounds: Normal breath sounds.   Abdominal:      General: Abdomen is flat.   Musculoskeletal:         General: Normal range of motion.      Cervical back: Normal range of motion.   Skin:     General: Skin is warm.      Capillary Refill: Capillary refill takes less than 2 seconds.   Neurological:      Mental Status: She is alert and oriented to person, place, and time.   Psychiatric:         Mood and Affect: Mood normal.         Behavior: Behavior normal.         Thought Content: Thought content normal.         Judgment: Judgment normal.                           Assessment & Plan        There are no diagnoses linked to this encounter.

## 2023-05-02 NOTE — PROGRESS NOTES
"Pt is here today for postpartum visit.   Delivery type : on 3/27 via vag-spont  Complications with delivery : None   Currently breast feeding .   LMP : Not since delivery   BCM : Would like to discuss non-hormonal options.   Last pap : 8/2021 WNL   EPDS : 3  Pt states she has a \"rash\" on the tops of her breasts/chest area that appeared the day before delivery.   "

## 2024-04-24 ENCOUNTER — OFFICE VISIT (OUTPATIENT)
Dept: URGENT CARE | Facility: PHYSICIAN GROUP | Age: 29
End: 2024-04-24
Payer: COMMERCIAL

## 2024-04-24 VITALS
BODY MASS INDEX: 18.88 KG/M2 | HEART RATE: 82 BPM | WEIGHT: 100 LBS | SYSTOLIC BLOOD PRESSURE: 96 MMHG | DIASTOLIC BLOOD PRESSURE: 52 MMHG | OXYGEN SATURATION: 99 % | HEIGHT: 61 IN | TEMPERATURE: 98.5 F | RESPIRATION RATE: 18 BRPM
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
## 2024-04-24 PROCEDURE — 3074F SYST BP LT 130 MM HG: CPT | Performed by: PHYSICIAN ASSISTANT

## 2024-04-24 PROCEDURE — 99203 OFFICE O/P NEW LOW 30 MIN: CPT | Performed by: PHYSICIAN ASSISTANT

## 2024-04-24 PROCEDURE — 3078F DIAST BP <80 MM HG: CPT | Performed by: PHYSICIAN ASSISTANT

## 2024-04-24 RX ORDER — AMOXICILLIN AND CLAVULANATE POTASSIUM 875; 125 MG/1; MG/1
1 TABLET, FILM COATED ORAL 2 TIMES DAILY
Qty: 14 TABLET | Refills: 0 | Status: SHIPPED | OUTPATIENT
Start: 2024-04-24 | End: 2024-04-26

## 2024-04-24 ASSESSMENT — ENCOUNTER SYMPTOMS
EYE REDNESS: 0
EYE DISCHARGE: 0
VOMITING: 0
FEVER: 0
NAUSEA: 0

## 2024-04-24 NOTE — PROGRESS NOTES
Subjective     Eleanor Centeno is a 28 y.o. female who presents with Animal Bite (Dog bite yesterday, it just stopped bleeding, swollen, it seems to have gotten worse )          This is a new problem.  The patient presents to clinic complaining of a dog bite to her right calf x 1 day ago.  The patient states that she was visiting a friend's house a friend of a friend's dog bit her right calf.  The patient states the dog was not with her pump.  The patient states that she stepped over a baby gate not seen the dog.  The patient states she startled the dog who subsequently bit her right calf.  The patient states she sustained multiple puncture wounds and abrasions to the right calf.  The patient states she was experiencing some continued bleeding from one of the puncture wounds, but states this had resolved prior to arrival.  The patient reports associated pain, swelling, and redness to the injured area of her right calf.  The patient states she is having increased pain with walking.  The patient reports no associated fever.  She also reports no discharge/drainage from the wounds.  The patient taken OTC ibuprofen for her current symptoms.  The patient states her tetanus vaccine is up-to-date.  The patient states the dog is also up-to-date on his vaccines.      Animal Bite  Pertinent negatives include no fever, nausea or vomiting.     PMH:  has a past medical history of Asthma, Chronic back pain, Migraine, Rh negative state in antepartum period, first trimester (9/28/2022), and Spina bifida occulta.    She has no past medical history of Addisons disease ([facility]), Adrenal disorder ([facility]), Allergy, Anemia, Arthritis, Cancer ([facility]), Cataract, CHF (congestive heart failure) ([facility]), Clotting disorder ([facility]), COPD (chronic obstructive pulmonary disease) ([facility]), Cushings syndrome ([facility]), Diabetes ([facility]), Diabetic neuropathy ([facility]), GERD (gastroesophageal reflux disease), Glaucoma, Goiter, Head ache, Heart attack ([facility]), Heart murmur,  "HIV (human immunodeficiency virus infection) (HCC), Hyperlipidemia, Hypertension, IBD (inflammatory bowel disease), Kidney disease, Meningitis, Muscle disorder, Osteoporosis, Parathyroid disorder (HCC), Pituitary disease (HCC), Pulmonary emphysema (HCC), Seizure (HCC), Sickle cell disease (HCC), Stroke (HCC), Substance abuse (HCC), Thyroid disease, Tuberculosis, or Urinary tract infection.  MEDS:   Current Outpatient Medications:     sertraline (ZOLOFT) 50 MG Tab, Take 1 Tablet by mouth every day., Disp: 30 Tablet, Rfl: 11    Prenatal MV-Min-Fe Fum-FA-DHA (PRENATAL 1 PO), Take  by mouth., Disp: , Rfl:   ALLERGIES:   Allergies   Allergen Reactions    Tape Rash     Adhesives- occasional reactions     SURGHX:   Past Surgical History:   Procedure Laterality Date    INSERTION OR REMOVAL, IUD N/A 11/03/2021    Procedure: INSERTION OR REMOVAL,IUD - REMOVAL;  Surgeon: Valeriy Emery M.D.;  Location: SURGERY SAME DAY [facility];  Service: Obstetrics    DENTAL EXTRACTION(S)      [place] teeth    MO REMOVAL OF TONSILS,<13 Y/O       SOCHX:  reports that she has been smoking cigarettes. She has never used smokeless tobacco. She reports that she does not drink alcohol and does not use drugs.  FH: Family history was reviewed, no pertinent findings to report      Review of Systems   Constitutional:  Negative for fever.   Eyes:  Negative for discharge and redness.   Gastrointestinal:  Negative for nausea and vomiting.              Objective     BP 96/52 (BP Location: Left arm, Patient Position: Sitting, BP Cuff Size: Adult long)   Pulse 82   Temp 36.9 °C (98.5 °F) (Temporal)   Resp 18   Ht 1.549 m (5' 1\")   Wt 45.4 kg (100 lb)   SpO2 99%   BMI 18.89 kg/m²      Physical Exam  Constitutional:       General: She is not in acute distress.     Appearance: Normal appearance. She is well-developed. She is not ill-appearing.   HENT:      Head: Normocephalic and atraumatic.      Right Ear: External ear normal.      Left Ear: " External ear normal.      Nose: Nose normal.   Eyes:      Extraocular Movements: Extraocular movements intact.      Conjunctiva/sclera: Conjunctivae normal.   Cardiovascular:      Rate and Rhythm: Normal rate.   Pulmonary:      Effort: Pulmonary effort is normal.   Musculoskeletal:      Cervical back: Normal range of motion and neck supple.      Comments:   Right Calf:  The patient has several puncture wounds with surrounding abrasions to the posterior lateral aspect of the right calf with scabbing in place.  A localized area of tenderness is present to the injured area with associated edema, overlying erythema, and increased warmth.  The patient's edema, erythema, and increased warmth extends proximally and distally from the injured area.  No active bleeding.  No discharge/drainage.  No palpable induration.  No palpable fluctuance.  No signs of lymphangitis.     Skin:     General: Skin is warm and dry.   Neurological:      Mental Status: She is alert and oriented to person, place, and time.               Progress:   Wound Care:  Cleanse the patient's wounds with Hibiclens.  Irrigated the wounds with saline.  Applied a small amount of Polysporin and a nonstick with an Ace bandage for mild compression to the patient's right lower leg.  Educated the patient on proper wound care.  Advised the patient to monitor for worsening signs of infection.           Assessment & Plan          1. Dog bite, initial encounter  - amoxicillin-clavulanate (AUGMENTIN) 875-125 MG Tab; Take 1 Tablet by mouth 2 times a day for 7 days.  Dispense: 14 Tablet; Refill: 0    The patient's presenting symptoms and physical exam findings are consistent with a dog bite to the right calf.  The patient's dog bite appear secondarily infected at this time.  Will prescribe the patient Augmentin for her acute infection.  Advised the patient to monitor for worsening signs or symptoms.  Recommend OTC medications and supportive care for symptomatic  management.  Recommend patient follow-up with primary care.  Discussed strict return precautions with the patient, and she verbalized understanding.    Differential diagnoses, supportive care, and indications for immediate follow-up discussed with patient.   Instructed to return to clinic or nearest emergency department for any change in condition, further concerns, or worsening of symptoms.    OTC NSAIDs for pain/discomfort  Keep wound clean and dry  RICE  Monitor for worsening signs or symptoms  Follow-up with PCP as needed  Return to clinic or go to the ED if symptoms worsen or fail to improve, or if patient should develop worsening/increasing/persistent pain/tenderness, swelling, increased redness warmth, discharge/drainage, fever/chills, secondary signs of infection, and/or any concerning symptoms.    Discussed plan with the patient, and she agrees to the above.     I personally reviewed prior external notes and test results pertinent to today's visit.  I have independently reviewed and interpreted all diagnostics ordered during this urgent care visit.     Please note that this dictation was created using voice recognition software. I have made every reasonable attempt to correct obvious errors, but I expect that there may be errors of grammar and possibly content that I did not discover before finalizing the note.     This note was electronically signed by Afia Castellanos PA-C

## 2024-04-26 ENCOUNTER — HOSPITAL ENCOUNTER (EMERGENCY)
Facility: MEDICAL CENTER | Age: 29
End: 2024-04-26
Attending: EMERGENCY MEDICINE
Payer: COMMERCIAL

## 2024-04-26 VITALS
DIASTOLIC BLOOD PRESSURE: 69 MMHG | HEART RATE: 77 BPM | BODY MASS INDEX: 19.15 KG/M2 | OXYGEN SATURATION: 94 % | TEMPERATURE: 98.2 F | SYSTOLIC BLOOD PRESSURE: 102 MMHG | RESPIRATION RATE: 18 BRPM | HEIGHT: 61 IN | WEIGHT: 101.41 LBS
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
## 2024-04-26 DIAGNOSIS — L03.115 CELLULITIS OF LEG, RIGHT: ICD-10-CM
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
## 2024-04-26 PROCEDURE — 700102 HCHG RX REV CODE 250 W/ 637 OVERRIDE(OP): Mod: UD | Performed by: EMERGENCY MEDICINE

## 2024-04-26 PROCEDURE — 99283 EMERGENCY DEPT VISIT LOW MDM: CPT | Mod: EDC

## 2024-04-26 PROCEDURE — A9270 NON-COVERED ITEM OR SERVICE: HCPCS | Mod: UD | Performed by: EMERGENCY MEDICINE

## 2024-04-26 RX ORDER — AMOXICILLIN AND CLAVULANATE POTASSIUM 875; 125 MG/1; MG/1
1 TABLET, FILM COATED ORAL 2 TIMES DAILY
Qty: 10 TABLET | Refills: 0 | Status: ACTIVE | OUTPATIENT
Start: 2024-04-26 | End: 2024-05-01

## 2024-04-26 RX ORDER — OXYCODONE HYDROCHLORIDE 5 MG/1
5 TABLET ORAL ONCE
Status: COMPLETED | OUTPATIENT
Start: 2024-04-26 | End: 2024-04-26

## 2024-04-26 RX ADMIN — OXYCODONE 5 MG: 5 TABLET ORAL at 12:40

## 2024-04-26 NOTE — ED NOTES
"[facility]anor Centeno [facility].    Discharge instructions, which include signs and symptoms to monitor patient for, as well as detailed information regarding Dog bite, Cellulitis of leg provided.  All questions and concerns addressed at this time.      Prescription for Amoxicillin provided to patient's preferred pharmacy. Patient advised that they will need to finish the entire course of antibiotics regardless if symptoms improve.  Patient advised to stop taking medications if signs and symptoms of allergic reaction occur and advised that medications can take approx 48 hours to take effect. Patient advised to add probiotic to diet in case patient has diarrhea from antibiotic use.  Patient verbalizes understanding. Patient provided education on when to return to the ER included, but not limited to, uncontrolled pain  when medicating with motrin, tylenol, signs and symptoms of dehydration, increased swelling and redness of extremity or other signs and syptoms of infection including fevers, drainage from wound, and difficulty breathing.  Patient advised to follow up with PCP and verbally understands with no concerns.     Patient leaves ER in no apparent distress. This RN provided education regarding returning to the ER for any new concerns or changes in patient's condition.      /69   Pulse 77   Temp 36.8 °C (98.2 °F) (Temporal)   Resp 18   Ht 1.549 m (5' 1\")   Wt 46 kg (101 lb 6.6 oz)   LMP  (LMP Unknown)   SpO2 94%   BMI 19.16 kg/m²    "

## 2024-04-26 NOTE — ED PROVIDER NOTES
ED Provider Note    CHIEF COMPLAINT  Chief Complaint   Patient presents with    Leg Pain     Rt LE    Dog Bite     3d ago       EXTERNAL RECORDS REVIEWED  Outpatient Notes outpatient urgent care note and visit reviewed from 4/24/2024    HPI/ROS  LIMITATION TO HISTORY   Select: : None  OUTSIDE HISTORIAN(S):  None    Eleanor Centeno is a 28 y.o. female who presents to the ER for wound reevaluation.  Patient had a dog bite to the leg 3 days ago.  On antibiotics.  Due to persistent discomfort at the bite site as well as some diffuse redness to the leg she was directed here for reevaluation ongoing care.  Tetanus up-to-date.  No other systemic symptoms.    PAST MEDICAL HISTORY   has a past medical history of Asthma, Chronic back pain, Migraine, Rh negative state in antepartum period, first trimester (9/28/2022), and Spina bifida occulta.    SURGICAL HISTORY   has a past surgical history that includes removal of tonsils,<12 y/o; dental extraction(s); and insertion or removal, iud (N/A, 11/03/2021).    FAMILY HISTORY  Family History   Problem Relation Age of Onset    Arthritis Mother     Lung Disease Mother     Cancer Mother         cervical ca    Psychiatric Illness Mother     Hypertension Mother     Alcohol/Drug Mother     Ovarian Cancer Mother     Psychiatric Illness Sister         schizophernia    Alcohol/Drug Sister     Seizures Sister     Cancer Maternal Aunt     Psychiatric Illness Maternal Aunt        SOCIAL HISTORY  Social History     Tobacco Use    Smoking status: Some Days     Current packs/day: 0.50     Types: Cigarettes    Smokeless tobacco: Never    Tobacco comments:     Less than a pack a day.    Vaping Use    Vaping Use: Never used   Substance and Sexual Activity    Alcohol use: No    Drug use: No    Sexual activity: Yes     Partners: Male     Birth control/protection: None     Comment: Planned pregnancy.       CURRENT MEDICATIONS  Home Medications       Reviewed by Yissel Reyez R.N. (Registered  "Nurse) on 04/26/24 at 1120  Med List Status: Partial     Medication Last Dose Status   amoxicillin-clavulanate (AUGMENTIN) 875-125 MG Tab  Active   Prenatal MV-Min-Fe Fum-FA-DHA (PRENATAL 1 PO)  Active   sertraline (ZOLOFT) 50 MG Tab  Active                    ALLERGIES  Allergies   Allergen Reactions    Tape Rash     Adhesives- occasional reactions       PHYSICAL EXAM  VITAL SIGNS: /69   Pulse 77   Temp 36.8 °C (98.2 °F) (Temporal)   Resp 18   Ht 1.549 m (5' 1\")   Wt 46 kg (101 lb 6.6 oz)   LMP  (LMP Unknown)   SpO2 94%   BMI 19.16 kg/m²          Pulse ox interpretation: I interpret this pulse ox as normal.  Constitutional: Alert in no apparent distress.  HENT: No signs of trauma    Thorax & Lungs: No respiratory distress    Skin: Warm, Dry    Extremities: Intact distal pulses  RLE:     Scabbed puncture sites to lateral leg.  There is some induration underlying this area with slight increased warmth to this direct area which is slightly more ecchymotic.  Additionally there is a general hue of erythema throughout the entirety of the leg.  There is no lymphangitic streaking.    Musculoskeletal: Good range of motion in all major joints.   Neurologic: Alert , Normal motor function, Normal sensory function, No focal deficits noted.   Psychiatric: Affect normal, Judgment normal, Mood normal.           COURSE & MEDICAL DECISION MAKING    ASSESSMENT, COURSE AND PLAN  Care Narrative: 28-year-old presenting to the [facility] for dog bite wound reevaluation.  Overall the patient appears well.  No systemic concerns for more overt SIRS/sepsis.    At this point I do not believe that any further emergent care workup will be required.  Will extend Augmentin course and have the patient return for further wound care/reevaluation    DISPOSITION AND DISCUSSIONS  I have discussed management of the patient with the following physicians and NI's: None    Discussion of management with other \[facility]\"" or appropriate source(s): " Pharmacy for medication verification      Escalation of care considered, and ultimately not performed:Laboratory analysis, diagnostic imaging, and acute inpatient care management, however at this time, the patient is most appropriate for outpatient management    Barriers to care at this time, including but not limited to: Patient does not have established PCP.     Decision tools and prescription drugs considered including, but not limited to: Medication modification Augmentin will be extended .    28-year-old presenting to the emerged part with above presentation.  Please see assessment, course and plan as above.  Patient understanding return precautions here to the ER if needed.  Has been provided with outpatient follow-up as well.    FINAL DIAGNOSIS  1. Dog bite, subsequent encounter    2. Cellulitis of leg, right    3. Dog bite, initial encounter           Electronically signed by: Santosh Eugene M.D., 4/26/2024 12:18 PM

## 2024-04-26 NOTE — ED TRIAGE NOTES
Pt amb to triage.  Chief Complaint   Patient presents with    Leg Pain     Rt LE    Dog Bite     3d ago     Reports pain and swelling is worse even after 5 doses of abx.

## 2024-06-05 SDOH — ECONOMIC STABILITY: INCOME INSECURITY: IN THE LAST 12 MONTHS, WAS THERE A TIME WHEN YOU WERE NOT ABLE TO PAY THE MORTGAGE OR RENT ON TIME?: YES

## 2024-06-05 SDOH — HEALTH STABILITY: MENTAL HEALTH
STRESS IS WHEN SOMEONE FEELS TENSE, NERVOUS, ANXIOUS, OR CAN'T SLEEP AT NIGHT BECAUSE THEIR MIND IS TROUBLED. HOW STRESSED ARE YOU?: VERY MUCH

## 2024-06-05 SDOH — ECONOMIC STABILITY: INCOME INSECURITY: HOW HARD IS IT FOR YOU TO PAY FOR THE VERY BASICS LIKE FOOD, HOUSING, MEDICAL CARE, AND HEATING?: NOT VERY HARD

## 2024-06-05 SDOH — ECONOMIC STABILITY: TRANSPORTATION INSECURITY
IN THE PAST 12 MONTHS, HAS THE LACK OF TRANSPORTATION KEPT YOU FROM MEDICAL APPOINTMENTS OR FROM GETTING MEDICATIONS?: NO

## 2024-06-05 SDOH — ECONOMIC STABILITY: FOOD INSECURITY: WITHIN THE PAST 12 MONTHS, YOU WORRIED THAT YOUR FOOD WOULD RUN OUT BEFORE YOU GOT MONEY TO BUY MORE.: NEVER TRUE

## 2024-06-05 SDOH — ECONOMIC STABILITY: FOOD INSECURITY: WITHIN THE PAST 12 MONTHS, THE FOOD YOU BOUGHT JUST DIDN'T LAST AND YOU DIDN'T HAVE MONEY TO GET MORE.: NEVER TRUE

## 2024-06-05 SDOH — HEALTH STABILITY: PHYSICAL HEALTH: ON AVERAGE, HOW MANY DAYS PER WEEK DO YOU ENGAGE IN MODERATE TO STRENUOUS EXERCISE (LIKE A BRISK WALK)?: 2 DAYS

## 2024-06-05 SDOH — ECONOMIC STABILITY: HOUSING INSECURITY
IN THE LAST 12 MONTHS, WAS THERE A TIME WHEN YOU DID NOT HAVE A STEADY PLACE TO SLEEP OR SLEPT IN A SHELTER (INCLUDING NOW)?: YES

## 2024-06-05 SDOH — ECONOMIC STABILITY: HOUSING INSECURITY
IN THE LAST 12 MONTHS, WAS THERE A TIME WHEN YOU DID NOT HAVE A STEADY PLACE TO SLEEP OR SLEPT IN A SHELTER (INCLUDING NOW)?: NO

## 2024-06-05 SDOH — ECONOMIC STABILITY: HOUSING INSECURITY: IN THE LAST 12 MONTHS, HOW MANY PLACES HAVE YOU LIVED?: 1

## 2024-06-05 SDOH — HEALTH STABILITY: PHYSICAL HEALTH: ON AVERAGE, HOW MANY MINUTES DO YOU ENGAGE IN EXERCISE AT THIS LEVEL?: 40 MIN

## 2024-06-05 SDOH — ECONOMIC STABILITY: TRANSPORTATION INSECURITY
IN THE PAST 12 MONTHS, HAS LACK OF RELIABLE TRANSPORTATION KEPT YOU FROM MEDICAL APPOINTMENTS, MEETINGS, WORK OR FROM GETTING THINGS NEEDED FOR DAILY LIVING?: NO

## 2024-06-05 SDOH — ECONOMIC STABILITY: TRANSPORTATION INSECURITY
IN THE PAST 12 MONTHS, HAS LACK OF TRANSPORTATION KEPT YOU FROM MEETINGS, WORK, OR FROM GETTING THINGS NEEDED FOR DAILY LIVING?: NO

## 2024-06-05 ASSESSMENT — SOCIAL DETERMINANTS OF HEALTH (SDOH)
IN A TYPICAL WEEK, HOW MANY TIMES DO YOU TALK ON THE PHONE WITH FAMILY, FRIENDS, OR NEIGHBORS?: THREE TIMES A WEEK
HOW OFTEN DO YOU ATTENT MEETINGS OF THE CLUB OR ORGANIZATION YOU BELONG TO?: NEVER
HOW OFTEN DO YOU ATTENT MEETINGS OF THE CLUB OR ORGANIZATION YOU BELONG TO?: NEVER
DO YOU BELONG TO ANY CLUBS OR ORGANIZATIONS SUCH AS CHURCH GROUPS UNIONS, FRATERNAL OR ATHLETIC GROUPS, OR SCHOOL GROUPS?: NO
HOW OFTEN DO YOU HAVE A DRINK CONTAINING ALCOHOL: NEVER
IN A TYPICAL WEEK, HOW MANY TIMES DO YOU TALK ON THE PHONE WITH FAMILY, FRIENDS, OR NEIGHBORS?: THREE TIMES A WEEK
HOW HARD IS IT FOR YOU TO PAY FOR THE VERY BASICS LIKE FOOD, HOUSING, MEDICAL CARE, AND HEATING?: NOT VERY HARD
WITHIN THE PAST 12 MONTHS, YOU WORRIED THAT YOUR FOOD WOULD RUN OUT BEFORE YOU GOT THE MONEY TO BUY MORE: NEVER TRUE
HOW OFTEN DO YOU ATTEND CHURCH OR RELIGIOUS SERVICES?: NEVER
HOW MANY DRINKS CONTAINING ALCOHOL DO YOU HAVE ON A TYPICAL DAY WHEN YOU ARE DRINKING: PATIENT DOES NOT DRINK
HOW OFTEN DO YOU ATTEND CHURCH OR RELIGIOUS SERVICES?: NEVER
HOW OFTEN DO YOU HAVE SIX OR MORE DRINKS ON ONE OCCASION: NEVER
DO YOU BELONG TO ANY CLUBS OR ORGANIZATIONS SUCH AS CHURCH GROUPS UNIONS, FRATERNAL OR ATHLETIC GROUPS, OR SCHOOL GROUPS?: NO

## 2024-06-05 ASSESSMENT — LIFESTYLE VARIABLES
AUDIT-C TOTAL SCORE: 0
SKIP TO QUESTIONS 9-10: 1
HOW OFTEN DO YOU HAVE SIX OR MORE DRINKS ON ONE OCCASION: NEVER
HOW MANY STANDARD DRINKS CONTAINING ALCOHOL DO YOU HAVE ON A TYPICAL DAY: PATIENT DOES NOT DRINK
HOW OFTEN DO YOU HAVE A DRINK CONTAINING ALCOHOL: NEVER

## 2024-06-06 ENCOUNTER — OFFICE VISIT (OUTPATIENT)
Dept: MEDICAL GROUP | Facility: MEDICAL CENTER | Age: 29
End: 2024-06-06
Payer: COMMERCIAL

## 2024-06-06 VITALS
OXYGEN SATURATION: 100 % | TEMPERATURE: 98.5 F | HEART RATE: 103 BPM | WEIGHT: 96.4 LBS | BODY MASS INDEX: 18.2 KG/M2 | SYSTOLIC BLOOD PRESSURE: 90 MMHG | DIASTOLIC BLOOD PRESSURE: 68 MMHG | HEIGHT: 61 IN

## 2024-06-06 DIAGNOSIS — R55 SYNCOPE, UNSPECIFIED SYNCOPE TYPE: ICD-10-CM

## 2024-06-06 DIAGNOSIS — F33.0 MILD EPISODE OF RECURRENT MAJOR DEPRESSIVE DISORDER (HCC): ICD-10-CM

## 2024-06-06 PROCEDURE — 99204 OFFICE O/P NEW MOD 45 MIN: CPT

## 2024-06-06 PROCEDURE — 3078F DIAST BP <80 MM HG: CPT

## 2024-06-06 PROCEDURE — 3074F SYST BP LT 130 MM HG: CPT

## 2024-06-06 PROCEDURE — 93005 ELECTROCARDIOGRAM TRACING: CPT

## 2024-06-06 PROCEDURE — 99213 OFFICE O/P EST LOW 20 MIN: CPT

## 2024-06-06 RX ORDER — SERTRALINE HYDROCHLORIDE 25 MG/1
25 TABLET, FILM COATED ORAL DAILY
Qty: 30 TABLET | Refills: 2 | Status: SHIPPED | OUTPATIENT
Start: 2024-06-06

## 2024-06-06 RX ORDER — HYDROXYZINE HYDROCHLORIDE 25 MG/1
25 TABLET, FILM COATED ORAL 3 TIMES DAILY PRN
Qty: 30 TABLET | Refills: 0 | Status: SHIPPED | OUTPATIENT
Start: 2024-06-06

## 2024-06-06 ASSESSMENT — PATIENT HEALTH QUESTIONNAIRE - PHQ9
5. POOR APPETITE OR OVEREATING: 3 - NEARLY EVERY DAY
CLINICAL INTERPRETATION OF PHQ2 SCORE: 4
SUM OF ALL RESPONSES TO PHQ QUESTIONS 1-9: 12

## 2024-06-06 ASSESSMENT — ANXIETY QUESTIONNAIRES
2. NOT BEING ABLE TO STOP OR CONTROL WORRYING: NEARLY EVERY DAY
IF YOU CHECKED OFF ANY PROBLEMS ON THIS QUESTIONNAIRE, HOW DIFFICULT HAVE THESE PROBLEMS MADE IT FOR YOU TO DO YOUR WORK, TAKE CARE OF THINGS AT HOME, OR GET ALONG WITH OTHER PEOPLE: EXTREMELY DIFFICULT
3. WORRYING TOO MUCH ABOUT DIFFERENT THINGS: NEARLY EVERY DAY
6. BECOMING EASILY ANNOYED OR IRRITABLE: NEARLY EVERY DAY
5. BEING SO RESTLESS THAT IT IS HARD TO SIT STILL: NEARLY EVERY DAY
GAD7 TOTAL SCORE: 21
7. FEELING AFRAID AS IF SOMETHING AWFUL MIGHT HAPPEN: NEARLY EVERY DAY
1. FEELING NERVOUS, ANXIOUS, OR ON EDGE: NEARLY EVERY DAY
4. TROUBLE RELAXING: NEARLY EVERY DAY

## 2024-06-07 NOTE — ASSESSMENT & PLAN NOTE
"Patient reports that since she was 16 years old she has experinced episodes of faiting. She said there are some triggers, such as lifting her arms over her head, standing up too fast or even occasionally just while she's walking or in the grocery store. She is sometimes aware its going to happen and is able to get her self to the ground prior to it happening. The first thing she feels is her HR racing and then her body gets hot. Then she begins to feels dizzy and her vision changes. She reports that she has only ever been tested for anemia. She reports the events happen 2 times per month at minimum. She reports being unconscious for 1-15min. When she comes around she feels normal. If she tries to get up too fast she is at risk of losing consciousness again. She does not feel nauseated with the events but does feel clammy and sweats. When she synopsizes her  reports that she looks very pale and if her eyes are open \"like no one is home\"  "

## 2024-06-07 NOTE — ASSESSMENT & PLAN NOTE
Patient was previously taking zoloft while pregnanct and then lost her insurance. She was previously also seeing  psychologist to help with her mood. She reports a significant improvement in her mood when she was participating in therapy and on zoloft. She is interested in starting both again.

## 2024-06-07 NOTE — PROGRESS NOTES
"Subjective:     CC:  Diagnoses of Mild episode of recurrent major depressive disorder (HCC) and Syncope, unspecified syncope type were pertinent to this visit.    HISTORY OF THE PRESENT ILLNESS: Patient is a 28 y.o. female. This pleasant patient is here today to establish care.    Mild episode of recurrent major depressive disorder (HCC)  Patient was previously taking zoloft while pregnanct and then lost her insurance. She was previously also seeing  psychologist to help with her mood. She reports a significant improvement in her mood when she was participating in therapy and on zoloft. She is interested in starting both again.       Syncope and collapse  Patient reports that since she was 16 years old she has experinced episodes of faiting. She said there are some triggers, such as lifting her arms over her head, standing up too fast or even occasionally just while she's walking or in the grocery store. She is sometimes aware its going to happen and is able to get her self to the ground prior to it happening. The first thing she feels is her HR racing and then her body gets hot. Then she begins to feels dizzy and her vision changes. She reports that she has only ever been tested for anemia. She reports the events happen 2 times per month at minimum. She reports being unconscious for 1-15min. When she comes around she feels normal. If she tries to get up too fast she is at risk of losing consciousness again. She does not feel nauseated with the events but does feel clammy and sweats. When she synopsizes her  reports that she looks very pale and if her eyes are open \"like no one is home\"      Health Maintenance: reviewed and completed per patient preference.     ROS:   - CONSTITUTIONAL: Denies weight loss, fever and chills.  - HEENT: Denies changes in vision and hearing.  - RESPIRATORY: Denies SOB and cough.  - CV: Denies palpitations and CP.  - GI: Denies abdominal pain, nausea, vomiting and diarrhea.  - : " Denies dysuria and urinary frequency.  - MSK: Denies myalgia and joint pain.  - SKIN: Denies rash and pruritus.  - NEUROLOGICAL: Denies headache. Endorses syncope.  - PSYCHIATRIC: Denies recent changes in mood. Denies anxiety and depression.           Social History     Socioeconomic History    Marital status:      Spouse name: Not on file    Number of children: Not on file    Years of education: Not on file    Highest education level: GED or equivalent   Occupational History    Not on file   Tobacco Use    Smoking status: Former     Current packs/day: 0.00     Average packs/day: 0.5 packs/day for 13.0 years (6.5 ttl pk-yrs)     Types: Cigarettes     Start date:      Quit date:      Years since quittin.4    Smokeless tobacco: Never   Vaping Use    Vaping status: Every Day    Substances: Nicotine    Devices: Disposable, Pre-filled or refillable cartridge   Substance and Sexual Activity    Alcohol use: No    Drug use: No    Sexual activity: Yes     Partners: Male     Birth control/protection: None     Comment: Planned pregnancy.   Other Topics Concern    Not on file   Social History Narrative    Not on file     Social Determinants of Health     Financial Resource Strain: Low Risk  (2024)    Overall Financial Resource Strain (CARDIA)     Difficulty of Paying Living Expenses: Not very hard   Food Insecurity: No Food Insecurity (2024)    Hunger Vital Sign     Worried About Running Out of Food in the Last Year: Never true     Ran Out of Food in the Last Year: Never true   Transportation Needs: No Transportation Needs (2024)    PRAPARE - Transportation     Lack of Transportation (Medical): No     Lack of Transportation (Non-Medical): No   Physical Activity: Insufficiently Active (2024)    Exercise Vital Sign     Days of Exercise per Week: 2 days     Minutes of Exercise per Session: 40 min   Stress: Stress Concern Present (2024)    [employer] -  "Occupational Stress Questionnaire     Feeling of Stress : Very much   Social Connections: Moderately Isolated (6/5/2024)    Social Connection and Isolation Panel [NHANES]     Frequency of Communication with Friends and Family: Three times a week     Frequency of Social Gatherings with Friends and Family: Not on file     Attends Scientologist Services: Never     Active Member of Clubs or Organizations: No     Attends Club or Organization Meetings: Never     Marital Status:    Intimate Partner Violence: Not on file   Housing Stability: High Risk (6/5/2024)    Housing Stability Vital Sign     Unable to Pay for Housing in the Last Year: Yes     Number of Places Lived in the Last Year: 1     Unstable Housing in the Last Year: No      Allergies   Allergen Reactions    Tape Rash     Adhesives- occasional reactions            Current Outpatient Medications:     sertraline, 25 mg, Oral, DAILY    hydrOXYzine HCl, 25 mg, Oral, TID PRN   Objective:     Exam: BP 90/68 (BP Location: Left arm, Patient Position: Sitting, BP Cuff Size: Adult)   Pulse (!) 103   Temp 36.9 °C (98.5 °F) (Temporal)   Ht 1.549 m (5' 1\")   Wt 43.7 kg (96 lb 6.4 oz)   SpO2 100%  Body mass index is 18.21 kg/m².    Physical Exam:  Constitutional: Alert, no distress, well-groomed.  Skin: Warm, dry, good turgor, no rashes in visible areas.  Eye: Equal, round and reactive, conjunctiva clear, lids normal.  ENMT: Lips without lesions, good dentition, moist mucous membranes.  Neck: Trachea midline, no masses, no thyromegaly.  Respiratory: Unlabored respiratory effort, no cough.  Abd: soft, non tender, non distended, normal BS  MSK: Normal gait, moves all extremities.  Neuro: Grossly non-focal.   Psych: Alert and oriented x3, normal affect and mood.  Depression Screening    Little interest or pleasure in doing things?  1 - several days   Feeling down, depressed , or hopeless? 3 - nearly every day   Trouble falling or staying asleep, or sleeping too much?  2 " - more than half the days   Feeling tired or having little energy?  2 - more than half the days   Poor appetite or overeating?  3 - nearly every day   Feeling bad about yourself - or that you are a failure or have let yourself or your family down? 1 - several days   Trouble concentrating on things, such as reading the newspaper or watching television? 0 - not at all   Moving or speaking so slowly that other people could have noticed.  Or the opposite - being so fidgety or restless that you have been moving around a lot more than usual?  0 - not at all   Thoughts that you would be better off dead, or of hurting yourself?  0 - not at all   Patient Health Questionnaire Score: 12       If depressive symptoms identified deferred to follow up visit unless specifically addressed in assesment and plan.    Interpretation of PHQ-9 Total Score   Score Severity   1-4 No Depression   5-9 Mild Depression   10-14 Moderate Depression   15-19 Moderately Severe Depression   20-27 Severe Depression    EKG Interpretation   Ordered and interpreted by Cleopatra QUINTEROS  Rhythm: normal sinus   Rate: normal   Axis: normal   Ectopy: none   Conduction: normal   ST Segments: no acute change   T Waves: no acute change   Q Waves: none   Clinical Impression: no acute changes and normal EKG    Labs: Reviewed    Assessment & Plan:   28 y.o. female with the following -    1. Mild episode of recurrent major depressive disorder (HCC)  Chronic, uncontrolled.  PHQ-9 12.  Patient reports that since delivering her baby her depression has gotten worse.  She reports a significant improvement in her symptoms with sertraline 25 mg and would like to restart it.  She is also interested in starting therapy again as this combined with medication is most effective.  She would also like something to take the edge off when she is feeling increasingly anxious.  We discussed hydroxyzine 25 mg up to 3 times daily as needed.  We will follow-up in 1 month for mood  check.  - Patient has been identified as having a positive depression screening. Appropriate orders and counseling have been given.  - Referral to Psychology  - sertraline (ZOLOFT) 25 MG tablet; Take 1 Tablet by mouth every day.  Dispense: 30 Tablet; Refill: 2  - hydrOXYzine HCl (ATARAX) 25 MG Tab; Take 1 Tablet by mouth 3 times a day as needed for Anxiety.  Dispense: 30 Tablet; Refill: 0    2. Syncope, unspecified syncope type  Acute on chronic issue, etiology unsure prognosis uncertain.  Potentially related to orthostatic hypotension versus anxiety.  EKG with no abnormal findings.  Will follow-up with labs and echocardiogram.  - Comp Metabolic Panel; Future  - CBC WITHOUT DIFFERENTIAL; Future  - HEMOGLOBIN A1C; Future  - Lipid Profile; Future  - TSH WITH REFLEX TO FT4; Future  - VITAMIN D,25 HYDROXY (DEFICIENCY); Future  - EKG - Clinic Performed  - EC-ECHOCARDIOGRAM COMPLETE W/O CONT; Future        Return in about 4 weeks (around 7/4/2024) for Med Check, FU Labs, Mood Check.    Please note that this dictation was created using voice recognition software. I have made every reasonable attempt to correct obvious errors, but I expect that there are errors of grammar and possibly content that I did not discover before finalizing the note.

## 2024-06-25 ENCOUNTER — HOSPITAL ENCOUNTER (OUTPATIENT)
Dept: LAB | Facility: MEDICAL CENTER | Age: 29
End: 2024-06-25
Payer: COMMERCIAL

## 2024-06-25 DIAGNOSIS — R55 SYNCOPE, UNSPECIFIED SYNCOPE TYPE: ICD-10-CM

## 2024-06-25 LAB
25(OH)D3 SERPL-MCNC: 33 NG/ML (ref 30–100)
ALBUMIN SERPL BCP-MCNC: 4.3 G/DL (ref 3.2–4.9)
ALBUMIN/GLOB SERPL: 2 G/DL
ALP SERPL-CCNC: 99 U/L (ref 30–99)
ALT SERPL-CCNC: 8 U/L (ref 2–50)
ANION GAP SERPL CALC-SCNC: 9 MMOL/L (ref 7–16)
AST SERPL-CCNC: 15 U/L (ref 12–45)
BILIRUB SERPL-MCNC: 0.3 MG/DL (ref 0.1–1.5)
BUN SERPL-MCNC: 10 MG/DL (ref 8–22)
CALCIUM ALBUM COR SERPL-MCNC: 8.8 MG/DL (ref 8.5–10.5)
CALCIUM SERPL-MCNC: 9 MG/DL (ref 8.5–10.5)
CHLORIDE SERPL-SCNC: 104 MMOL/L (ref 96–112)
CHOLEST SERPL-MCNC: 150 MG/DL (ref 100–199)
CO2 SERPL-SCNC: 25 MMOL/L (ref 20–33)
CREAT SERPL-MCNC: 0.69 MG/DL (ref 0.5–1.4)
ERYTHROCYTE [DISTWIDTH] IN BLOOD BY AUTOMATED COUNT: 38.5 FL (ref 35.9–50)
EST. AVERAGE GLUCOSE BLD GHB EST-MCNC: 94 MG/DL
FASTING STATUS PATIENT QL REPORTED: NORMAL
GFR SERPLBLD CREATININE-BSD FMLA CKD-EPI: 122 ML/MIN/1.73 M 2
GLOBULIN SER CALC-MCNC: 2.2 G/DL (ref 1.9–3.5)
GLUCOSE SERPL-MCNC: 87 MG/DL (ref 65–99)
HBA1C MFR BLD: 4.9 % (ref 4–5.6)
HCT VFR BLD AUTO: 44.6 % (ref 37–47)
HDLC SERPL-MCNC: 46 MG/DL
HGB BLD-MCNC: 14.9 G/DL (ref 12–16)
LDLC SERPL CALC-MCNC: 88 MG/DL
MCH RBC QN AUTO: 29.6 PG (ref 27–33)
MCHC RBC AUTO-ENTMCNC: 33.4 G/DL (ref 32.2–35.5)
MCV RBC AUTO: 88.7 FL (ref 81.4–97.8)
PLATELET # BLD AUTO: 277 K/UL (ref 164–446)
PMV BLD AUTO: 11 FL (ref 9–12.9)
POTASSIUM SERPL-SCNC: 4.7 MMOL/L (ref 3.6–5.5)
PROT SERPL-MCNC: 6.5 G/DL (ref 6–8.2)
RBC # BLD AUTO: 5.03 M/UL (ref 4.2–5.4)
SODIUM SERPL-SCNC: 138 MMOL/L (ref 135–145)
TRIGL SERPL-MCNC: 80 MG/DL (ref 0–149)
TSH SERPL DL<=0.005 MIU/L-ACNC: 0.78 UIU/ML (ref 0.38–5.33)
WBC # BLD AUTO: 6.4 K/UL (ref 4.8–10.8)

## 2024-06-25 PROCEDURE — 84443 ASSAY THYROID STIM HORMONE: CPT

## 2024-06-25 PROCEDURE — 36415 COLL VENOUS BLD VENIPUNCTURE: CPT

## 2024-06-25 PROCEDURE — 80061 LIPID PANEL: CPT

## 2024-06-25 PROCEDURE — 80053 COMPREHEN METABOLIC PANEL: CPT

## 2024-06-25 PROCEDURE — 82306 VITAMIN D 25 HYDROXY: CPT

## 2024-06-25 PROCEDURE — 83036 HEMOGLOBIN GLYCOSYLATED A1C: CPT

## 2024-06-25 PROCEDURE — 85027 COMPLETE CBC AUTOMATED: CPT

## 2024-08-13 ENCOUNTER — ANCILLARY PROCEDURE (OUTPATIENT)
Dept: CARDIOLOGY | Facility: MEDICAL CENTER | Age: 29
End: 2024-08-13
Payer: COMMERCIAL

## 2024-08-13 DIAGNOSIS — R55 SYNCOPE, UNSPECIFIED SYNCOPE TYPE: ICD-10-CM

## 2024-08-13 LAB
LV EJECT FRACT  99904: 63
LV EJECT FRACT MOD 2C 99903: 64.09
LV EJECT FRACT MOD 4C 99902: 61.99
LV EJECT FRACT MOD BP 99901: 63.86

## 2024-08-13 PROCEDURE — 93306 TTE W/DOPPLER COMPLETE: CPT | Mod: 26 | Performed by: INTERNAL MEDICINE

## 2024-08-13 PROCEDURE — 93306 TTE W/DOPPLER COMPLETE: CPT

## 2024-08-16 DIAGNOSIS — R55 SYNCOPE AND COLLAPSE: ICD-10-CM

## 2024-08-22 ENCOUNTER — OFFICE VISIT (OUTPATIENT)
Dept: CARDIOLOGY | Facility: MEDICAL CENTER | Age: 29
End: 2024-08-22
Payer: COMMERCIAL

## 2024-08-22 VITALS
HEART RATE: 95 BPM | BODY MASS INDEX: 20.58 KG/M2 | DIASTOLIC BLOOD PRESSURE: 50 MMHG | WEIGHT: 109 LBS | SYSTOLIC BLOOD PRESSURE: 86 MMHG | RESPIRATION RATE: 16 BRPM | HEIGHT: 61 IN | OXYGEN SATURATION: 97 %

## 2024-08-22 DIAGNOSIS — I34.0 MILD MITRAL REGURGITATION: ICD-10-CM

## 2024-08-22 DIAGNOSIS — R55 SYNCOPE, UNSPECIFIED SYNCOPE TYPE: ICD-10-CM

## 2024-08-22 DIAGNOSIS — R55 SYNCOPE AND COLLAPSE: ICD-10-CM

## 2024-08-22 DIAGNOSIS — Z82.0 FAMILY HISTORY OF SEIZURE IN SISTER: ICD-10-CM

## 2024-08-22 PROCEDURE — 99211 OFF/OP EST MAY X REQ PHY/QHP: CPT | Performed by: INTERNAL MEDICINE

## 2024-08-22 PROCEDURE — 99204 OFFICE O/P NEW MOD 45 MIN: CPT | Performed by: INTERNAL MEDICINE

## 2024-08-22 PROCEDURE — 3074F SYST BP LT 130 MM HG: CPT | Performed by: INTERNAL MEDICINE

## 2024-08-22 PROCEDURE — 3078F DIAST BP <80 MM HG: CPT | Performed by: INTERNAL MEDICINE

## 2024-08-22 RX ORDER — MIRTAZAPINE 7.5 MG/1
TABLET, FILM COATED ORAL
COMMUNITY
Start: 2024-08-13

## 2024-08-22 ASSESSMENT — FIBROSIS 4 INDEX: FIB4 SCORE: 0.52

## 2024-08-22 NOTE — PROGRESS NOTES
CARDIOLOGY CONSULTATION NOTE      Date of Visit: 8/22/2024    Primary Care Provider: STU Kuo    Patient Name: Eleanor Centeno  YOB: 1995  MRN: [id number]     Reason for Visit:   Syncope     Patient Story:   Eleanor Centeno is a 28 year-old woman with a past medical history of depression and recurrent lightheadedness/syncope.  Briefly, she reported intermittent episodes of lightheadedness and occasionally (to be to her PCP starting around the age of 16.  She noted that these episodes have continued to occur about 1-2 times a month and she was referred to Cardiology for further evaluation.      She presents today for consultation.  She states that since around the age of 16, several times a month she will experience an episode where she feels like her heart is racing she then feels flushed and lightheaded and on rare occasions she will frankly syncope.  These episodes do seem to have increased in frequency recently and she sometimes will have up to 4-5 episodes a month.  During these episodes she also feels zoned out and her  notes that she seems unresponsive for typically 5 to 30 seconds, but rarely do these episodes last more than about a minute.  She also recovers quickly and does not seem confused after these episodes occur.  These episodes are not specifically brought on by positional changes, although she separately notices that she does often feel lightheaded when she stands up quickly.  She does note that she struggles to keep hydrated well.  These episodes can occur at rest, with standing, or during activities like walking and are sometimes brought on by raising her hands above her head.  She has not frankly syncopized out in over 2 years, but she remains concerned that these episodes continue to occur.  She does note that her sister suffers from abscence seizures, but sometimes he could progress to [place] seizures.     Medications and Allergies:      Current Outpatient Medications   Medication Sig Dispense Refill    mirtazapine (REMERON) 7.5 MG tablet TAKE 1 TABLET EVERY DAY BY ORAL ROUTE AS DIRECTED FOR 30 DAYS, FOR SLEEP.      sertraline (ZOLOFT) 25 MG tablet Take 1 Tablet by mouth every day. 30 Tablet 2    hydrOXYzine HCl (ATARAX) 25 MG Tab Take 1 Tablet by mouth 3 times a day as needed for Anxiety. 30 Tablet 0     No current facility-administered medications for this visit.     Allergies   Allergen Reactions    Tape Rash     Adhesives- occasional reactions      Medical Decision Making:   # Racing heart rates, syncope: Overall, I am suspicious that her symptoms may be due to abscence seizures given her description of these events and her sister's diagnosis as there can be a genetic contribution to absence seizures.  Nonetheless, her symptoms of a racing heart rate could also reflect an underlying arrhythmia such as SVT, therefore, I did recommend undergoing cardiac monitoring to exclude an arrhythmic cause to her symptoms.  Will also check her cortisol and plasma metanephrine levels.  If her cardiac monitoring and lab testing is unremarkable, we will then place a referral to Neurology for further evaluation.    # Mild mitral regurgitation: I reassured her that a small amount of mitral regurgitation is not uncommon and typically does not progress to more severe mitral regurgitation in the absence of structural valvular abnormalities, which she does not appear to have.  She also does not have a significant murmur on examination concerning for more severe regurgitation.  She can have a surveillance study performed by her PCP in 3-5 years to assess for any progression if she has no change in symptoms.    # Mild orthostasis: She was very mildly orthostatic in the office today and I suspect this is related to mild hypovolemia.  Her orthostatic blood pressures were not consistent with a diagnosis of POTS or severe orthostasis.  She was instructed on the  "importance of maintaining adequate hydration to reduce her symptoms of positional lightheadedness.    Follow Up  As needed pending review of cardiac monitoring.  If cardiac monitoring is unremarkable, will then place a referral to Neurology for further evaluation.     Cardiac Studies and Procedures:   Echocardiography  TTE (8/13/2024)-independently interpreted  Normal transthoracic echocardiogram with eccentric, probably mild mitral regurgitation.    Electrophysiology  ECG (6/6/2024)-independently interpreted  Normal sinus rhythm, nonspecific inferior T wave changes     Vital Signs:   BP (!) 86/50 (BP Location: Left arm, Patient Position: Standing, BP Cuff Size: Adult)   Pulse 95   Resp 16   Ht 1.549 m (5' 1\")   Wt 49.4 kg (109 lb)   SpO2 97%    BP Readings from Last 4 Encounters:   08/22/24 (!) 86/50   06/06/24 90/68   04/26/24 102/69   04/24/24 96/52     Wt Readings from Last 4 Encounters:   08/22/24 49.4 kg (109 lb)   06/06/24 43.7 kg (96 lb 6.4 oz)   04/26/24 46 kg (101 lb 6.6 oz)   04/24/24 45.4 kg (100 lb)     Body mass index is 20.6 kg/m².     Laboratories:   Lipids  Lab Results   Component Value Date/Time    LDL 88 06/25/2024 10:08 AM       Lab Results   Component Value Date/Time    HDL 46 06/25/2024 10:08 AM       Lab Results   Component Value Date/Time    TRIGLYCERIDE 80 06/25/2024 10:08 AM       Lab Results   Component Value Date/Time    CHOLSTRLTOT 150 06/25/2024 10:08 AM       No components found for: \"LPA\"      Chemistries  Lab Results   Component Value Date/Time    CREATININE 0.69 06/25/2024 10:08 AM    CREATININE 0.60 03/08/2015 10:40 PM    CREATININE 0.76 10/22/2014 10:27 PM     Lab Results   Component Value Date/Time    BUN 10 06/25/2024 10:08 AM    BUN 11 03/08/2015 10:40 PM    BUN 6 (L) 10/22/2014 10:27 PM     Lab Results   Component Value Date/Time    POTASSIUM 4.7 06/25/2024 10:08 AM    POTASSIUM 3.4 (L) 03/08/2015 10:40 PM    POTASSIUM 3.8 10/22/2014 10:27 PM     Lab Results   Component " "Value Date/Time    SODIUM 138 06/25/2024 10:08 AM    SODIUM 135 03/08/2015 10:40 PM    SODIUM 140 10/22/2014 10:27 PM     Lab Results   Component Value Date/Time    GLUCOSE 87 06/25/2024 10:08 AM    GLUCOSE 77 03/08/2015 10:40 PM    GLUCOSE 83 10/22/2014 10:27 PM     Lab Results   Component Value Date/Time    ASTSGOT 15 06/25/2024 10:08 AM    ASTSGOT 16 03/08/2015 10:40 PM    ASTSGOT 22 10/22/2014 10:27 PM     Lab Results   Component Value Date/Time    ALTSGPT 8 06/25/2024 10:08 AM    ALTSGPT 11 03/08/2015 10:40 PM    ALTSGPT 18 10/22/2014 10:27 PM     Lab Results   Component Value Date/Time    ALKPHOSPHAT 99 06/25/2024 10:08 AM    ALKPHOSPHAT 49 03/08/2015 10:40 PM    ALKPHOSPHAT 103 10/22/2014 10:27 PM     Lab Results   Component Value Date/Time    HBA1C 4.9 06/25/2024 10:08 AM     No results found for: \"TSH\"  No results found for: \"NTPROBNP\"  No results found for: \"TROPONINT\"    Blood Counts  Lab Results   Component Value Date/Time    HEMOGLOBIN 14.9 06/25/2024 10:08 AM    HEMOGLOBIN 10.7 (L) 03/29/2023 05:35 AM    HEMOGLOBIN 13.6 03/27/2023 02:40 PM     Lab Results   Component Value Date/Time    PLATELETCT 277 06/25/2024 10:08 AM    PLATELETCT 151 (L) 03/29/2023 05:35 AM    PLATELETCT 194 03/27/2023 02:40 PM     Lab Results   Component Value Date/Time    WBC 6.4 06/25/2024 10:08 AM    WBC 10.1 03/29/2023 05:35 AM    WBC 11.5 (H) 03/27/2023 02:40 PM      Physical Examination:   General: Well-appearing, no acute distress  Eyes: Extraocular movements intact, anicteric  Ears: No ear lobe crease  Neck: Full range of motion, no jugular venous distension  Pulmonary: Normal respiratory effort, clear to auscultation bilaterally  Cardiovascular: Regular rate and rhythm, no murmurs or gallops appreciated  Gastrointestinal: Thin, nondistended  Extremities: Warm and well perfused, no gross lower extremity edema  Neurological: Alert and oriented, normal speech  Psychiatric: Appropriate affect, normal judgment     Past " History:   Past Medical History  The patient's past medical history was reviewed.  See HPI and self-reported patient medical history form for pertinent medical history to consultation.    Past Social History  The patient's social history was reviewed.  See HPI self-reported patient medical history form for pertinent social history to consultation.    Past Family History  The patient's family history was reviewed.  See HPI self-reported patient medical history form for pertinent family history to consultation.    Review of Systems  A pertinent cardiac review of systems was performed and was otherwise unremarkable except as per HPI and self-reported patient medical history form.        Roscoe Juarez MD, [facility]  Interventional Cardiology  [facility], [address]  [address] [address]  Phone: [phone]  Fax: [phone]

## 2024-08-29 ENCOUNTER — NON-PROVIDER VISIT (OUTPATIENT)
Dept: CARDIOLOGY | Facility: MEDICAL CENTER | Age: 29
End: 2024-08-29
Payer: COMMERCIAL

## 2024-08-29 DIAGNOSIS — R55 SYNCOPE, UNSPECIFIED SYNCOPE TYPE: ICD-10-CM

## 2024-08-29 PROCEDURE — 93270 REMOTE 30 DAY ECG REV/REPORT: CPT

## 2024-08-29 NOTE — PROGRESS NOTES
Patient enrolled in the 30 day [employer] Heart monitoring program per Roscoe Juarez MD..  >Office hook-up with Baseline transmitted, serial # [id number].  >Pending EOS

## 2024-10-28 ENCOUNTER — TELEPHONE (OUTPATIENT)
Dept: CARDIOLOGY | Facility: MEDICAL CENTER | Age: 29
End: 2024-10-28
Payer: COMMERCIAL

## 2024-10-29 ENCOUNTER — TELEPHONE (OUTPATIENT)
Dept: CARDIOLOGY | Facility: MEDICAL CENTER | Age: 29
End: 2024-10-29
Payer: COMMERCIAL

## 2024-10-29 DIAGNOSIS — Z82.0 FAMILY HISTORY OF SEIZURE IN SISTER: ICD-10-CM

## 2024-10-29 DIAGNOSIS — R55 SYNCOPE, UNSPECIFIED SYNCOPE TYPE: ICD-10-CM

## 2024-10-29 DIAGNOSIS — R42 POSTURAL LIGHTHEADEDNESS: ICD-10-CM

## 2024-10-29 DIAGNOSIS — R42 LIGHTHEADEDNESS: ICD-10-CM

## 2024-10-29 DIAGNOSIS — R55 SYNCOPE AND COLLAPSE: ICD-10-CM

## 2024-11-05 ENCOUNTER — APPOINTMENT (OUTPATIENT)
Dept: URGENT CARE | Facility: CLINIC | Age: 29
End: 2024-11-05
Payer: COMMERCIAL

## 2024-11-05 ENCOUNTER — APPOINTMENT (OUTPATIENT)
Dept: URGENT CARE | Facility: PHYSICIAN GROUP | Age: 29
End: 2024-11-05
Payer: COMMERCIAL

## 2024-11-06 ENCOUNTER — OFFICE VISIT (OUTPATIENT)
Dept: URGENT CARE | Facility: PHYSICIAN GROUP | Age: 29
End: 2024-11-06
Payer: COMMERCIAL

## 2024-11-06 VITALS
HEIGHT: 61 IN | BODY MASS INDEX: 24.79 KG/M2 | SYSTOLIC BLOOD PRESSURE: 114 MMHG | TEMPERATURE: 98.2 F | RESPIRATION RATE: 16 BRPM | DIASTOLIC BLOOD PRESSURE: 70 MMHG | WEIGHT: 131.28 LBS | OXYGEN SATURATION: 100 % | HEART RATE: 95 BPM

## 2024-11-06 DIAGNOSIS — K04.7 DENTAL INFECTION: ICD-10-CM

## 2024-11-06 PROCEDURE — 3078F DIAST BP <80 MM HG: CPT

## 2024-11-06 PROCEDURE — 3074F SYST BP LT 130 MM HG: CPT

## 2024-11-06 PROCEDURE — 99214 OFFICE O/P EST MOD 30 MIN: CPT

## 2024-11-06 ASSESSMENT — FIBROSIS 4 INDEX: FIB4 SCORE: 0.52

## 2024-11-06 NOTE — PROGRESS NOTES
"Subjective:   Eleanor Centeno is a 28 y.o. female who presents for Dental Pain (C/o LT sided upper teeth pain x4 days. Pain has radiated down to her jaw and throat. Does not have a dentist, unable to schedule appt until next week. )      HPI:    Patient presents to urgent care with her SO with concerns of severe dental pain.   States she has a broken tooth on the left upper jaw. She has had four days of worsening dental pain and chills. Patient states \"this is the worst pain of her life\". Unable to see a dentist until next week.     ROS As above in HPI    Medications:    Current Outpatient Medications on File Prior to Visit   Medication Sig Dispense Refill    sertraline (ZOLOFT) 50 MG Tab       mirtazapine (REMERON) 7.5 MG tablet TAKE 1 TABLET EVERY DAY BY ORAL ROUTE AS DIRECTED FOR 30 DAYS, FOR SLEEP.      sertraline (ZOLOFT) 25 MG tablet Take 1 Tablet by mouth every day. 30 Tablet 2    hydrOXYzine HCl (ATARAX) 25 MG Tab Take 1 Tablet by mouth 3 times a day as needed for Anxiety. 30 Tablet 0     No current facility-administered medications on file prior to visit.        Allergies:   Tape    Problem List:   Patient Active Problem List   Diagnosis    Mild episode of recurrent major depressive disorder (HCC)    Syncope and collapse    Mild mitral regurgitation    Family history of seizure in sister        Surgical History:  Past Surgical History:   Procedure Laterality Date    INSERTION OR REMOVAL, IUD N/A 2021    Procedure: INSERTION OR REMOVAL,IUD - REMOVAL;  Surgeon: Valeriy Emery M.D.;  Location: SURGERY SAME DAY [facility];  Service: Obstetrics    DENTAL EXTRACTION(S)      [place] teeth    NH REMOVAL OF TONSILS,<12 Y/O         Past Social Hx:   Social History     Tobacco Use    Smoking status: Former     Current packs/day: 0.00     Average packs/day: 0.5 packs/day for 13.0 years (6.5 ttl pk-yrs)     Types: Cigarettes     Start date:      Quit date:      Years since quittin.8    Smokeless " "tobacco: Never   Vaping Use    Vaping status: Every Day    Substances: Nicotine    Devices: Disposable, Pre-filled or refillable cartridge   Substance Use Topics    Alcohol use: No    Drug use: No          Problem list, medications, and allergies reviewed by myself today in Epic.     Objective:     /70 (BP Location: Right arm, Patient Position: Sitting, BP Cuff Size: Adult)   Pulse 95   Temp 36.8 °C (98.2 °F) (Temporal)   Resp 16   Ht 1.549 m (5' 1\") Comment: Pt reported  Wt 59.5 kg (131 lb 4.5 oz)   SpO2 100%   BMI 24.81 kg/m²     Physical Exam  Vitals and nursing note reviewed.   Constitutional:       General: She is not in acute distress.     Appearance: Normal appearance. She is not ill-appearing.   HENT:      Head: Normocephalic.      Jaw: There is normal jaw occlusion.      Right Ear: Tympanic membrane and ear canal normal.      Left Ear: Tympanic membrane and ear canal normal.      Mouth/Throat:      Dentition: Abnormal dentition. Dental tenderness, gingival swelling and dental caries present.        Comments: Teeth # 12, #13 are absent. Tooth #14 is broken in half.  Neurological:      Mental Status: She is alert.   Psychiatric:         Mood and Affect: Affect is tearful.       Assessment/Plan:       Diagnosis and associated orders:   1. Dental infection  - amoxicillin-clavulanate (AUGMENTIN) 875-125 MG Tab; Take 1 Tablet by mouth 2 times a day for 7 days.  Dispense: 14 Tablet; Refill: 0    Other orders  - sertraline (ZOLOFT) 50 MG Tab        Comments/MDM:     Will start patient on antibiotics for possible dental infection. Teeth #12, 13 are absent and tooth #14 is broken in half. Patient has an appointment with dentist next week.   Patient is requesting pain medications for the worst pain of her life. Patient sent to nearest ER for pain management for 10/10 pain.  Patient verbalized understanding and consented to plan of care.        Return to clinic or go to ED if symptoms worsen or persist. " Indications for ED discussed at length. Patient/Parent/Guardian voices understanding. Follow-up with your primary care provider in 3-5 days. Red flag symptoms discussed. All side effects of medication discussed including allergic response, GI upset, tendon injury, rash, sedation etc.    Please note that this dictation was created using voice recognition software. I have made a reasonable attempt to correct obvious errors, but I expect that there are errors of grammar and possibly content that I did not discover before finalizing the note.    This note was electronically signed by ALDAIR Elias

## 2024-11-13 ENCOUNTER — OFFICE VISIT (OUTPATIENT)
Dept: MEDICAL GROUP | Facility: MEDICAL CENTER | Age: 29
End: 2024-11-13
Payer: COMMERCIAL

## 2024-11-13 VITALS
TEMPERATURE: 98.6 F | RESPIRATION RATE: 16 BRPM | SYSTOLIC BLOOD PRESSURE: 118 MMHG | HEIGHT: 61 IN | OXYGEN SATURATION: 98 % | WEIGHT: 131.4 LBS | DIASTOLIC BLOOD PRESSURE: 60 MMHG | BODY MASS INDEX: 24.81 KG/M2 | HEART RATE: 106 BPM

## 2024-11-13 DIAGNOSIS — R63.0 ANOREXIA: ICD-10-CM

## 2024-11-13 DIAGNOSIS — Z32.00 POSSIBLE PREGNANCY, NOT CONFIRMED: ICD-10-CM

## 2024-11-13 DIAGNOSIS — R19.7 DIARRHEA, UNSPECIFIED TYPE: ICD-10-CM

## 2024-11-13 LAB
POCT INT CON NEG: NEGATIVE
POCT INT CON POS: POSITIVE
POCT URINE PREGNANCY TEST: NEGATIVE

## 2024-11-13 PROCEDURE — 3074F SYST BP LT 130 MM HG: CPT

## 2024-11-13 PROCEDURE — 81025 URINE PREGNANCY TEST: CPT

## 2024-11-13 PROCEDURE — 3078F DIAST BP <80 MM HG: CPT

## 2024-11-13 PROCEDURE — 99214 OFFICE O/P EST MOD 30 MIN: CPT

## 2024-11-13 PROCEDURE — 99212 OFFICE O/P EST SF 10 MIN: CPT

## 2024-11-13 ASSESSMENT — FIBROSIS 4 INDEX: FIB4 SCORE: 0.52

## 2024-11-13 NOTE — PROGRESS NOTES
Verbal consent was acquired by the patient to use [employer] ambient listening note generation during this visit     Subjective:     CC:  Diagnoses of Anorexia, Diarrhea, unspecified type, and Possible pregnancy, not confirmed were pertinent to this visit.    HISTORY OF THE PRESENT ILLNESS: Patient is a 28 y.o. female.     History of Present Illness  The patient presents for evaluation of diarrhea.    She has been experiencing diarrhea for the past 2 weeks, initially attributing it to a stomachache. The diarrhea started before she began taking antibiotics, which she believes exacerbated the condition. After starting antibiotics for tooth pain, she noticed her stool appeared similar to undigested rice and was neon green in color. She observed small white string-like structures in her stool. She noticed a small amount of blood when wiping, but not in her stool. She reports no significant pain, only mild cramping. She experiences sudden cramping when she needs to use the bathroom. She is unsure about bloating but confirms feeling gassy. She has not made any recent changes to her diet or traveled recently. She recalls eating undercooked hamburgers a few weeks ago, which made her feel achy afterwards.    She has gained weight due to a medication that increases her appetite to manage her anorexia. Her current medications include hydroxyzine for anxiety, Remeron for sleep, and sertraline for mood regulation. She has been maintaining hydration by drinking plenty of water. She recently completed a course of Augmentin.    She recently stopped breastfeeding and her menstrual cycle is irregular. Her last period started on 10/19/2024.    She reports no fevers, although she felt feverish when she had tooth pain. She had ringworm as a child.    Health Maintenance: reviewed and completed per patient preference.     ROS:   Per HPI.           Social History     Socioeconomic History    Marital status:      Spouse name: Not on  file    Number of children: Not on file    Years of education: Not on file    Highest education level: GED or equivalent   Occupational History    Not on file   Tobacco Use    Smoking status: Former     Current packs/day: 0.00     Average packs/day: 0.5 packs/day for 13.0 years (6.5 ttl pk-yrs)     Types: Cigarettes     Start date:      Quit date:      Years since quittin.8    Smokeless tobacco: Never   Vaping Use    Vaping status: Every Day    Substances: Nicotine    Devices: Disposable, Pre-filled or refillable cartridge   Substance and Sexual Activity    Alcohol use: No    Drug use: No    Sexual activity: Yes     Partners: Male     Birth control/protection: None     Comment: Planned pregnancy.   Other Topics Concern    Not on file   Social History Narrative    Not on file     Social Drivers of Health     Financial Resource Strain: Low Risk  (2024)    Overall Financial Resource Strain (CARDIA)     Difficulty of Paying Living Expenses: Not very hard   Food Insecurity: No Food Insecurity (2024)    Hunger Vital Sign     Worried About Running Out of Food in the Last Year: Never true     Ran Out of Food in the Last Year: Never true   Transportation Needs: No Transportation Needs (2024)    PRAPARE - Transportation     Lack of Transportation (Medical): No     Lack of Transportation (Non-Medical): No   Physical Activity: Insufficiently Active (2024)    Exercise Vital Sign     Days of Exercise per Week: 2 days     Minutes of Exercise per Session: 40 min   Stress: Stress Concern Present (2024)    [facility] - Occupational Stress Questionnaire     Feeling of Stress : Very much   Social Connections: Moderately Isolated (2024)    Social Connection and Isolation Panel [NHANES]     Frequency of Communication with Friends and Family: Three times a week     Frequency of Social Gatherings with Friends and Family: Not on file     Attends Judaism Services: Never      "Active Member of Clubs or Organizations: No     Attends Club or Organization Meetings: Never     Marital Status:    Intimate Partner Violence: Not on file   Housing Stability: High Risk (6/5/2024)    Housing Stability Vital Sign     Unable to Pay for Housing in the Last Year: Yes     Number of Places Lived in the Last Year: 1     Unstable Housing in the Last Year: No      Allergies   Allergen Reactions    Tape Rash     Adhesives- occasional reactions            Current Outpatient Medications:     sertraline,     amoxicillin-clavulanate, 1 Tablet, Oral, BID    mirtazapine, TAKE 1 TABLET EVERY DAY BY ORAL ROUTE AS DIRECTED FOR 30 DAYS, FOR SLEEP.    sertraline, 25 mg, Oral, DAILY    hydrOXYzine HCl, 25 mg, Oral, TID PRN   Objective:     Exam: /60 (BP Location: Left arm, Patient Position: Sitting, BP Cuff Size: Adult)   Pulse (!) 106   Temp 37 °C (98.6 °F) (Temporal)   Resp 16   Ht 1.549 m (5' 1\")   Wt 59.6 kg (131 lb 6.4 oz)   SpO2 98%  Body mass index is 24.83 kg/m².    Physical Exam:  Constitutional: Alert, no distress, well-groomed.  Skin: Warm, dry, good turgor, no rashes in visible areas.  Eye: Equal, round and reactive, conjunctiva clear, lids normal.  ENMT: Lips without lesions, good dentition, moist mucous membranes.  Neck: Trachea midline, no masses, no thyromegaly.  Respiratory: Unlabored respiratory effort, no cough.  Abd: soft, non tender, non distended, normal BS  MSK: Normal gait, moves all extremities.  Neuro: Grossly non-focal.   Psych: Alert and oriented x3, normal affect and mood.    Labs: Reviewed    Assessment & Plan:   28 y.o. female with the following -    1. Anorexia  Chronic, well-controlled with Remeron.  Reports steady weight gain.  Will follow-up with psychiatry.    2. Diarrhea, unspecified type  The diarrhea has been ongoing for about two weeks, with symptoms including neon green stool, mild cramping, and occasional blood when wiping.  She also reports some abnormal white " rice looking particles in her stool.  A stool culture and a test for ova parasites will be ordered to determine the cause. She is advised to increase her dietary fiber intake with leafy green vegetables, broccoli, nuts, legumes, and rice, and to avoid dairy products. If the diarrhea persists beyond a month and all lab results are negative, a referral to a gastroenterologist will be considered.  - CULTURE STOOL; Future  - CRYPTO/GIARDIA RAPID ASSAY; Future    3. Possible pregnancy, not confirmed  Point-of-care pregnancy test negative.  - POCT Pregnancy      Return if symptoms worsen or fail to improve.    Please note that this dictation was created using voice recognition software. I have made every reasonable attempt to correct obvious errors, but I expect that there are errors of grammar and possibly content that I did not discover before finalizing the note.

## 2024-12-11 ENCOUNTER — RESEARCH ENCOUNTER (OUTPATIENT)
Dept: RESEARCH | Facility: MEDICAL CENTER | Age: 29
End: 2024-12-11
Payer: COMMERCIAL

## 2024-12-11 DIAGNOSIS — Z00.6 RESEARCH STUDY PATIENT: ICD-10-CM

## 2024-12-18 ENCOUNTER — HOSPITAL ENCOUNTER (OUTPATIENT)
Dept: LAB | Facility: MEDICAL CENTER | Age: 29
End: 2024-12-18
Attending: FAMILY MEDICINE
Payer: COMMERCIAL

## 2024-12-18 DIAGNOSIS — Z00.6 RESEARCH STUDY PATIENT: ICD-10-CM

## 2025-02-27 ENCOUNTER — HOSPITAL ENCOUNTER (OUTPATIENT)
Facility: MEDICAL CENTER | Age: 30
End: 2025-02-27
Payer: COMMERCIAL

## 2025-02-27 ENCOUNTER — APPOINTMENT (OUTPATIENT)
Dept: OBGYN | Facility: CLINIC | Age: 30
End: 2025-02-27
Payer: COMMERCIAL

## 2025-02-27 ENCOUNTER — INITIAL PRENATAL (OUTPATIENT)
Dept: OBGYN | Facility: CLINIC | Age: 30
End: 2025-02-27
Payer: COMMERCIAL

## 2025-02-27 VITALS — DIASTOLIC BLOOD PRESSURE: 58 MMHG | SYSTOLIC BLOOD PRESSURE: 104 MMHG | BODY MASS INDEX: 27.36 KG/M2 | WEIGHT: 144.8 LBS

## 2025-02-27 DIAGNOSIS — Z87.59 HISTORY OF PRIOR PREGNANCY WITH IUGR NEWBORN: ICD-10-CM

## 2025-02-27 DIAGNOSIS — Z34.81 ENCOUNTER FOR SUPERVISION OF OTHER NORMAL PREGNANCY IN FIRST TRIMESTER: ICD-10-CM

## 2025-02-27 DIAGNOSIS — Z87.39 HISTORY OF SCOLIOSIS: ICD-10-CM

## 2025-02-27 PROBLEM — O09.291 HISTORY OF IUGR (INTRAUTERINE GROWTH RETARDATION) AND STILLBIRTH, CURRENTLY PREGNANT, FIRST TRIMESTER: Status: ACTIVE | Noted: 2025-02-27

## 2025-02-27 PROBLEM — O09.291 HISTORY OF IUGR (INTRAUTERINE GROWTH RETARDATION) AND STILLBIRTH, CURRENTLY PREGNANT, FIRST TRIMESTER: Status: RESOLVED | Noted: 2025-02-27 | Resolved: 2025-02-27

## 2025-02-27 PROBLEM — Z34.90 SUPERVISION OF NORMAL PREGNANCY: Status: ACTIVE | Noted: 2025-02-27

## 2025-02-27 PROCEDURE — 87660 TRICHOMONAS VAGIN DIR PROBE: CPT

## 2025-02-27 PROCEDURE — 87510 GARDNER VAG DNA DIR PROBE: CPT

## 2025-02-27 PROCEDURE — 88142 CYTOPATH C/V THIN LAYER: CPT

## 2025-02-27 PROCEDURE — 87480 CANDIDA DNA DIR PROBE: CPT

## 2025-02-27 PROCEDURE — 87591 N.GONORRHOEAE DNA AMP PROB: CPT

## 2025-02-27 PROCEDURE — 87491 CHLMYD TRACH DNA AMP PROBE: CPT

## 2025-02-27 ASSESSMENT — FIBROSIS 4 INDEX: FIB4 SCORE: 0.54

## 2025-02-27 ASSESSMENT — EDINBURGH POSTNATAL DEPRESSION SCALE (EPDS)
I HAVE FELT SCARED OR PANICKY FOR NO GOOD REASON: NO, NOT AT ALL
I HAVE BLAMED MYSELF UNNECESSARILY WHEN THINGS WENT WRONG: NOT VERY OFTEN
I HAVE BEEN SO UNHAPPY THAT I HAVE BEEN CRYING: NO, NEVER
I HAVE BEEN ANXIOUS OR WORRIED FOR NO GOOD REASON: NO, NOT AT ALL
I HAVE BEEN SO UNHAPPY THAT I HAVE HAD DIFFICULTY SLEEPING: NOT AT ALL
I HAVE FELT SAD OR MISERABLE: NO, NOT AT ALL
TOTAL SCORE: 2
I HAVE BEEN ABLE TO LAUGH AND SEE THE FUNNY SIDE OF THINGS: AS MUCH AS I ALWAYS COULD
THINGS HAVE BEEN GETTING ON TOP OF ME: NO, MOST OF THE TIME I HAVE COPED QUITE WELL
I HAVE LOOKED FORWARD WITH ENJOYMENT TO THINGS: AS MUCH AS I EVER DID
THE THOUGHT OF HARMING MYSELF HAS OCCURRED TO ME: NEVER

## 2025-02-27 NOTE — PROGRESS NOTES
"Risk factors:   Hx IUGR  Referrals made today:   MFM    Subjective:   Eleanor Centeno is a 29 y.o.  who presents for her new OB exam.  She is 10w1d with an TRES of Estimated Date of Delivery: 25 by very approximate LMP.   She is feeling anxious and is reporting cramping. She reports no ER visits or previous care in this pregnancy. Reports no fetal movement.     History of IUGR with previous pregnancies.     Vaginal bleeding denies  Pain   denies  Cramping  denies  History of thyroid disease denies  History of high blood pressure denies  History of uterine surgery denies    Pre-eclampsia risk factors:   1 of the following: none  2 of the following: none    Diabetes risk factors:   One of the following: none    BMI greater than or equal to 25 and one of the following: none    FOB is involved. His name is Marciano.    Pregnancy is  unplanned but desired.    She is currently working as a dental assistant .   Denies alcohol use, drug use, or tobacco use in pregnancy.     Denies any current or hx of sexual, emotional or physical abuse or trauma.     Current Medications: PNV    Allergies: tape    Past Medical History:   Diagnosis Date    Asthma     \"sports asthma\"    Chronic back pain     related to scolosis    Migraine     Rh negative state in antepartum period, first trimester 2022    Spina bifida occulta        OB History    Para Term  AB Living   5 3 3  1 3   SAB IAB Ectopic Molar Multiple Live Births   1    0 3      # Outcome Date GA Lbr Sami/2nd Weight Sex Type Anes PTL Lv   5 Current            4 Term 23 38w2d / 00:24 6 lb 12.1 oz M Vag-Spont EPI N RADHA   3 SAB 10/01/18              Birth Comments: Unsure of how far along she was / Passed on it's own.   2 Term 17 39w0d  6 lb 5 oz F Vag-Spont  N RADHA      Birth Comments: IUGR   1 Term 10/01/15 39w0d  5 lb 15 oz F Vag-Spont  Y RADHA      Birth Comments: IUGR      Complications: Placenta Previa        Past Surgical History: "   Procedure Laterality Date    INSERTION OR REMOVAL, IUD N/A 11/03/2021    Procedure: INSERTION OR REMOVAL,IUD - REMOVAL;  Surgeon: Valeriy Emery M.D.;  Location: SURGERY SAME DAY [facility];  Service: Obstetrics    DENTAL EXTRACTION(S)      [place] teeth    NE REMOVAL OF TONSILS,<13 Y/O          Gynecological History  STIs  denies  HSV  denies  Abnormal pap denies    Psych History   Depression:  Reports taking medications and is coping well with therapy  Anxiety  Reports taking medications and going to therapy, coping well  Bipolar   denies  Postpartum Mood Changes reports      Family History   Problem Relation Age of Onset    Arthritis Mother     Lung Disease Mother     Cancer Mother         cervical ca    Psychiatric Illness Mother     Hypertension Mother     Alcohol/Drug Mother     Ovarian Cancer Mother     Psychiatric Illness Sister         schizophernia    Alcohol/Drug Sister     Seizures Sister     Cancer Maternal Aunt     Psychiatric Illness Maternal Aunt     No Known Problems Maternal Grandmother     No Known Problems Maternal Grandfather     No Known Problems Paternal Grandmother     No Known Problems Paternal Grandfather      Denies genetic disorders in family history.     Objective:      Vitals:    02/27/25 0959   BP: 104/58   Weight: 144 lb 12.8 oz        Physical Exam  Constitutional:       Appearance: Normal appearance.   HENT:      Head: Normocephalic and atraumatic.      Right Ear: External ear normal.      Left Ear: External ear normal.      Nose: Nose normal.      Mouth/Throat:      Mouth: Mucous membranes are moist.   Eyes:      Extraocular Movements: Extraocular movements intact.      Pupils: Pupils are equal, round, and reactive to light.   Cardiovascular:      Rate and Rhythm: Normal rate and regular rhythm.      Pulses: Normal pulses.      Heart sounds: Normal heart sounds.   Pulmonary:      Effort: Pulmonary effort is normal.      Breath sounds: Normal breath sounds.   Abdominal:       General: Bowel sounds are normal.   Genitourinary:     General: Normal vulva.      Comments: Uterus c/w 6-7 weeks gestation  Musculoskeletal:         General: Normal range of motion.      Cervical back: Normal range of motion and neck supple.   Skin:     General: Skin is warm and dry.      Capillary Refill: Capillary refill takes less than 2 seconds.   Neurological:      General: No focal deficit present.      Mental Status: She is alert and oriented to person, place, and time.   Psychiatric:         Mood and Affect: Mood normal.         Behavior: Behavior normal.           See Prenatal Flowsheet for vitals    A/P:     Problem List Items Addressed This Visit       History of scoliosis    Supervision of normal pregnancy    Assessment:  1. 29 y.o.  with IUP @ 10w1d per approximate LMP  2.  S=D  3. See problem list for conditions affecting pregnancy    Plan:   - Pap smear and Vaginal pathogen swab collected today   - Discussed carrier screening and genetic testing in pregnancy. Patient declines.  - Prenatal labs ordered - lab slip provided  - Discussed PNV, nutrition, adequate water intake, and exercise/weight gain in pregnancy  - S/sx of pregnancy warning signs and PTL precautions given  - Patient is a candidate for low dose aspirin use in pregnancy. She is educated on Aspirin 81mg once daily starting at 12 wks of pregnancy.  - Complete OB US in 10 wks  - 1st trimester US ordered for  and dating  - Return to clinic in 4 weeks.          Relevant Orders    THINPREP RFLX HPV ASCUS W/CTNG    US-OB 2ND 3RD TRI COMPLETE    CBC WITHOUT DIFFERENTIAL    T.PALLIDUM AB ALOK (SCREENING)    OP Prenatal Panel-Blood Bank    VARICELLA ZOSTER IGG AB    HIV AG/AB COMBO ASSAY SCREENING    RUBELLA ABS IGG    HEP B SURFACE ANTIGEN    HEP C VIRUS ANTIBODY    URINE CULTURE(NEW)    URINE DRUG SCREEN    VAGINAL PATHOGENS DNA PANEL    US-OB 1ST TRIMESTER SINGLE GEST    Referral to Perinatology    History of prior pregnancy with IUGR  [facility]

## 2025-02-27 NOTE — PROGRESS NOTES
Pt here for NOB apt.   Last pap : 8/2021 - WNL   LMP = 12/18/24   TRES = 9/24/25 by LMP aprox.   US : None   GA today = 10w1d aprox.   EPDS = 2  Pt is not interested in genetic testing.   Pt states she is scared as this is her 5th child.   Phone/Pharm verified.   [phone]

## 2025-02-27 NOTE — ASSESSMENT & PLAN NOTE
Assessment:  1. 29 y.o.  with IUP @ 10w1d per approximate LMP  2.  S=D  3. See problem list for conditions affecting pregnancy    Plan:   - Pap smear and Vaginal pathogen swab collected today   - Discussed carrier screening and genetic testing in pregnancy. Patient declines.  - Prenatal labs ordered - lab slip provided  - Discussed PNV, nutrition, adequate water intake, and exercise/weight gain in pregnancy  - S/sx of pregnancy warning signs and PTL precautions given  - Patient is a candidate for low dose aspirin use in pregnancy. She is educated on Aspirin 81mg once daily starting at 12 wks of pregnancy.  - Complete OB US in 10 wks  - 1st trimester US ordered for  and dating  - Return to clinic in 4 weeks.

## 2025-02-28 LAB
C TRACH DNA GENITAL QL NAA+PROBE: NEGATIVE
CANDIDA DNA VAG QL PROBE+SIG AMP: NEGATIVE
G VAGINALIS DNA VAG QL PROBE+SIG AMP: NEGATIVE
N GONORRHOEA DNA GENITAL QL NAA+PROBE: NEGATIVE
SPECIMEN SOURCE: NORMAL
T VAGINALIS DNA VAG QL PROBE+SIG AMP: NEGATIVE

## 2025-02-28 NOTE — Clinical Note
REFERRAL APPROVAL NOTICE         Sent on February 28, 2025                   Eleanor Centeno  [address]  [facility] [address]                   Dear MsIan Centeno,    After a careful review of the medical information and benefit coverage, [facility] has processed your referral. See below for additional details.    If applicable, you must be actively enrolled with your insurance for coverage of the authorized service. If you have any questions regarding your coverage, please contact your insurance directly.    REFERRAL INFORMATION   Referral #:  [id number]  Referred-To Department    Referred-By Provider:  Maternal Fetal Medicine    [facility]   Maternal Fetal Med      [address]  [facility] [address]  [phone] [address], [address]  [facility] [id number] [phone]    Referral Start Date:  02/27/2025  Referral End Date:   02/27/2026             SCHEDULING  If you do not already have an appointment, please call [phone] to make an appointment.     MORE INFORMATION  If you do not already have a [employer] account, sign up at: [email or web address]  You can access your medical information, make appointments, see lab results, billing information, and more.  If you have questions regarding this referral, please contact  the [facility] Referrals department at:             [phone]. Monday - Friday 8:00AM - 5:00PM.     Sincerely,    [facility]

## 2025-03-03 ENCOUNTER — RESULTS FOLLOW-UP (OUTPATIENT)
Dept: OBGYN | Facility: CLINIC | Age: 30
End: 2025-03-03

## 2025-03-05 LAB — THINPREP PAP, CYTOLOGY NL11781: NORMAL

## 2025-06-11 ENCOUNTER — APPOINTMENT (OUTPATIENT)
Dept: MEDICAL GROUP | Facility: MEDICAL CENTER | Age: 30
End: 2025-06-11
Payer: COMMERCIAL

## 2025-08-18 ENCOUNTER — OFFICE VISIT (OUTPATIENT)
Dept: INTERNAL MEDICINE | Facility: OTHER | Age: 30
End: 2025-08-18
Payer: COMMERCIAL

## 2025-08-18 VITALS
HEIGHT: 61 IN | HEART RATE: 90 BPM | DIASTOLIC BLOOD PRESSURE: 59 MMHG | WEIGHT: 143.4 LBS | SYSTOLIC BLOOD PRESSURE: 101 MMHG | OXYGEN SATURATION: 96 % | BODY MASS INDEX: 27.08 KG/M2 | TEMPERATURE: 98.3 F

## 2025-08-18 DIAGNOSIS — M19.90 ARTHRITIS: ICD-10-CM

## 2025-08-18 DIAGNOSIS — Z76.89 ENCOUNTER TO ESTABLISH CARE: Primary | ICD-10-CM

## 2025-08-18 PROCEDURE — 99204 OFFICE O/P NEW MOD 45 MIN: CPT | Mod: GC

## 2025-08-18 PROCEDURE — 3078F DIAST BP <80 MM HG: CPT | Mod: GC

## 2025-08-18 PROCEDURE — 3074F SYST BP LT 130 MM HG: CPT | Mod: GC

## 2025-08-18 SDOH — ECONOMIC STABILITY: FOOD INSECURITY: WITHIN THE PAST 12 MONTHS, YOU WORRIED THAT YOUR FOOD WOULD RUN OUT BEFORE YOU GOT MONEY TO BUY MORE.: SOMETIMES TRUE

## 2025-08-18 SDOH — ECONOMIC STABILITY: INCOME INSECURITY: IN THE LAST 12 MONTHS, WAS THERE A TIME WHEN YOU WERE NOT ABLE TO PAY THE MORTGAGE OR RENT ON TIME?: YES

## 2025-08-18 SDOH — ECONOMIC STABILITY: INCOME INSECURITY: HOW HARD IS IT FOR YOU TO PAY FOR THE VERY BASICS LIKE FOOD, HOUSING, MEDICAL CARE, AND HEATING?: HARD

## 2025-08-18 SDOH — ECONOMIC STABILITY: FOOD INSECURITY: WITHIN THE PAST 12 MONTHS, THE FOOD YOU BOUGHT JUST DIDN'T LAST AND YOU DIDN'T HAVE MONEY TO GET MORE.: SOMETIMES TRUE

## 2025-08-18 SDOH — HEALTH STABILITY: PHYSICAL HEALTH: ON AVERAGE, HOW MANY MINUTES DO YOU ENGAGE IN EXERCISE AT THIS LEVEL?: PATIENT DECLINED

## 2025-08-18 SDOH — HEALTH STABILITY: PHYSICAL HEALTH: ON AVERAGE, HOW MANY DAYS PER WEEK DO YOU ENGAGE IN MODERATE TO STRENUOUS EXERCISE (LIKE A BRISK WALK)?: 6 DAYS

## 2025-08-18 SDOH — HEALTH STABILITY: MENTAL HEALTH
STRESS IS WHEN SOMEONE FEELS TENSE, NERVOUS, ANXIOUS, OR CAN'T SLEEP AT NIGHT BECAUSE THEIR MIND IS TROUBLED. HOW STRESSED ARE YOU?: TO SOME EXTENT

## 2025-08-18 ASSESSMENT — ENCOUNTER SYMPTOMS
PSYCHIATRIC NEGATIVE: 1
HEMOPTYSIS: 0
EYES NEGATIVE: 1
GASTROINTESTINAL NEGATIVE: 1
SPUTUM PRODUCTION: 0
HEADACHES: 0
ORTHOPNEA: 0
ABDOMINAL PAIN: 0
DIZZINESS: 0
TINGLING: 0
CHILLS: 0
MUSCULOSKELETAL NEGATIVE: 1
FEVER: 0
NEUROLOGICAL NEGATIVE: 1
CONSTITUTIONAL NEGATIVE: 1
MYALGIAS: 0
PALPITATIONS: 0
HEARTBURN: 0
RESPIRATORY NEGATIVE: 1
HALLUCINATIONS: 0
WEIGHT LOSS: 0
CARDIOVASCULAR NEGATIVE: 1
COUGH: 0
DEPRESSION: 0

## 2025-08-18 ASSESSMENT — SOCIAL DETERMINANTS OF HEALTH (SDOH)
HOW OFTEN DO YOU ATTENT MEETINGS OF THE CLUB OR ORGANIZATION YOU BELONG TO?: NEVER
HOW OFTEN DO YOU ATTEND CHURCH OR RELIGIOUS SERVICES?: NEVER
IN THE PAST 12 MONTHS, HAS THE ELECTRIC, GAS, OIL, OR WATER COMPANY THREATENED TO SHUT OFF SERVICE IN YOUR HOME?: YES
IN A TYPICAL WEEK, HOW MANY TIMES DO YOU TALK ON THE PHONE WITH FAMILY, FRIENDS, OR NEIGHBORS?: ONCE A WEEK
HOW OFTEN DO YOU GET TOGETHER WITH FRIENDS OR RELATIVES?: NEVER
DO YOU BELONG TO ANY CLUBS OR ORGANIZATIONS SUCH AS CHURCH GROUPS UNIONS, FRATERNAL OR ATHLETIC GROUPS, OR SCHOOL GROUPS?: NO
HOW OFTEN DO YOU GET TOGETHER WITH FRIENDS OR RELATIVES?: NEVER
HOW OFTEN DO YOU HAVE A DRINK CONTAINING ALCOHOL: NEVER
IN A TYPICAL WEEK, HOW MANY TIMES DO YOU TALK ON THE PHONE WITH FAMILY, FRIENDS, OR NEIGHBORS?: ONCE A WEEK
DO YOU BELONG TO ANY CLUBS OR ORGANIZATIONS SUCH AS CHURCH GROUPS UNIONS, FRATERNAL OR ATHLETIC GROUPS, OR SCHOOL GROUPS?: NO
WITHIN THE PAST 12 MONTHS, YOU WORRIED THAT YOUR FOOD WOULD RUN OUT BEFORE YOU GOT THE MONEY TO BUY MORE: SOMETIMES TRUE
HOW MANY DRINKS CONTAINING ALCOHOL DO YOU HAVE ON A TYPICAL DAY WHEN YOU ARE DRINKING: PATIENT DOES NOT DRINK
HOW OFTEN DO YOU ATTENT MEETINGS OF THE CLUB OR ORGANIZATION YOU BELONG TO?: NEVER
HOW OFTEN DO YOU HAVE SIX OR MORE DRINKS ON ONE OCCASION: NEVER
HOW OFTEN DO YOU ATTEND CHURCH OR RELIGIOUS SERVICES?: NEVER
HOW HARD IS IT FOR YOU TO PAY FOR THE VERY BASICS LIKE FOOD, HOUSING, MEDICAL CARE, AND HEATING?: HARD

## 2025-08-18 ASSESSMENT — LIFESTYLE VARIABLES
HOW OFTEN DO YOU HAVE SIX OR MORE DRINKS ON ONE OCCASION: NEVER
HOW MANY STANDARD DRINKS CONTAINING ALCOHOL DO YOU HAVE ON A TYPICAL DAY: PATIENT DOES NOT DRINK
AUDIT-C TOTAL SCORE: 0
HOW OFTEN DO YOU HAVE A DRINK CONTAINING ALCOHOL: NEVER
SKIP TO QUESTIONS 9-10: 1

## 2025-08-18 ASSESSMENT — FIBROSIS 4 INDEX: FIB4 SCORE: 0.54

## 2025-08-25 ENCOUNTER — HOSPITAL ENCOUNTER (OUTPATIENT)
Dept: LAB | Facility: MEDICAL CENTER | Age: 30
End: 2025-08-25
Payer: COMMERCIAL

## 2025-08-25 DIAGNOSIS — M19.90 ARTHRITIS: ICD-10-CM

## 2025-08-25 LAB
ALBUMIN SERPL BCP-MCNC: 4.4 G/DL (ref 3.2–4.9)
ALBUMIN/GLOB SERPL: 1.8 G/DL
ALP SERPL-CCNC: 69 U/L (ref 30–99)
ALT SERPL-CCNC: 13 U/L (ref 2–50)
ANION GAP SERPL CALC-SCNC: 11 MMOL/L (ref 7–16)
AST SERPL-CCNC: 18 U/L (ref 12–45)
BASOPHILS # BLD AUTO: 0.5 % (ref 0–1.8)
BASOPHILS # BLD: 0.03 K/UL (ref 0–0.12)
BILIRUB SERPL-MCNC: 0.3 MG/DL (ref 0.1–1.5)
BUN SERPL-MCNC: 13 MG/DL (ref 8–22)
CALCIUM ALBUM COR SERPL-MCNC: 8.6 MG/DL (ref 8.5–10.5)
CALCIUM SERPL-MCNC: 8.9 MG/DL (ref 8.5–10.5)
CHLORIDE SERPL-SCNC: 104 MMOL/L (ref 96–112)
CO2 SERPL-SCNC: 21 MMOL/L (ref 20–33)
CREAT SERPL-MCNC: 0.89 MG/DL (ref 0.5–1.4)
EOSINOPHIL # BLD AUTO: 0.08 K/UL (ref 0–0.51)
EOSINOPHIL NFR BLD: 1.3 % (ref 0–6.9)
ERYTHROCYTE [DISTWIDTH] IN BLOOD BY AUTOMATED COUNT: 38.9 FL (ref 35.9–50)
GFR SERPLBLD CREATININE-BSD FMLA CKD-EPI: 90 ML/MIN/1.73 M 2
GLOBULIN SER CALC-MCNC: 2.4 G/DL (ref 1.9–3.5)
GLUCOSE SERPL-MCNC: 80 MG/DL (ref 65–99)
HCT VFR BLD AUTO: 43.8 % (ref 37–47)
HGB BLD-MCNC: 14.8 G/DL (ref 12–16)
IMM GRANULOCYTES # BLD AUTO: 0.02 K/UL (ref 0–0.11)
IMM GRANULOCYTES NFR BLD AUTO: 0.3 % (ref 0–0.9)
LYMPHOCYTES # BLD AUTO: 2.04 K/UL (ref 1–4.8)
LYMPHOCYTES NFR BLD: 33.4 % (ref 22–41)
MCH RBC QN AUTO: 29.1 PG (ref 27–33)
MCHC RBC AUTO-ENTMCNC: 33.8 G/DL (ref 32.2–35.5)
MCV RBC AUTO: 86.2 FL (ref 81.4–97.8)
MONOCYTES # BLD AUTO: 0.43 K/UL (ref 0–0.85)
MONOCYTES NFR BLD AUTO: 7 % (ref 0–13.4)
NEUTROPHILS # BLD AUTO: 3.5 K/UL (ref 1.82–7.42)
NEUTROPHILS NFR BLD: 57.5 % (ref 44–72)
NRBC # BLD AUTO: 0 K/UL
NRBC BLD-RTO: 0 /100 WBC (ref 0–0.2)
PLATELET # BLD AUTO: 242 K/UL (ref 164–446)
PMV BLD AUTO: 11.4 FL (ref 9–12.9)
POTASSIUM SERPL-SCNC: 4.5 MMOL/L (ref 3.6–5.5)
PROT SERPL-MCNC: 6.8 G/DL (ref 6–8.2)
RBC # BLD AUTO: 5.08 M/UL (ref 4.2–5.4)
SODIUM SERPL-SCNC: 136 MMOL/L (ref 135–145)
WBC # BLD AUTO: 6.1 K/UL (ref 4.8–10.8)

## 2025-08-25 PROCEDURE — 85025 COMPLETE CBC W/AUTO DIFF WBC: CPT

## 2025-08-25 PROCEDURE — 86038 ANTINUCLEAR ANTIBODIES: CPT

## 2025-08-25 PROCEDURE — 80053 COMPREHEN METABOLIC PANEL: CPT

## 2025-08-25 PROCEDURE — 36415 COLL VENOUS BLD VENIPUNCTURE: CPT

## 2025-08-27 LAB — NUCLEAR IGG SER QL IA: NORMAL

## 2025-09-12 ENCOUNTER — APPOINTMENT (OUTPATIENT)
Dept: INTERNAL MEDICINE | Facility: OTHER | Age: 30
End: 2025-09-12
Payer: COMMERCIAL

## 2025-09-19 ENCOUNTER — APPOINTMENT (OUTPATIENT)
Dept: INTERNAL MEDICINE | Facility: OTHER | Age: 30
End: 2025-09-19
Payer: COMMERCIAL

## (undated) DEVICE — TUBING INFLOW HYSTEROSCOPY (10EA/CA)

## (undated) DEVICE — CANISTER SUCTION RIGID RED 1500CC (40EA/CA)

## (undated) DEVICE — SUCTION INSTRUMENT YANKAUER BULBOUS TIP W/O VENT (50EA/CA)

## (undated) DEVICE — SODIUM CHL. IRRIGATION 0.9% 3000ML (4EA/CA 65CA/PF)

## (undated) DEVICE — PROTECTOR ULNA NERVE - (36PR/CA)

## (undated) DEVICE — TOWEL STOP TIMEOUT SAFETY FLAG (40EA/CA)

## (undated) DEVICE — PAD SANITARY 11IN MAXI IND WRAPPED  (12EA/PK 24PK/CA)

## (undated) DEVICE — Device

## (undated) DEVICE — SENSOR SPO2 NEO LNCS ADHESIVE (20/BX) SEE USER NOTES

## (undated) DEVICE — WATER IRRIGATION STERILE 1000ML (12EA/CA)

## (undated) DEVICE — KIT ANESTHESIA W/CIRCUIT & 3/LT BAG W/FILTER (20EA/CA)

## (undated) DEVICE — HEAD HOLDER JUNIOR/ADULT

## (undated) DEVICE — TRAY SRGPRP PVP IOD WT PRP - (20/CA)

## (undated) DEVICE — TUBE CONNECTING SUCTION - CLEAR PLASTIC STERILE 72 IN (50EA/CA)

## (undated) DEVICE — TUBING OUTFLOW HYSTEROSCPY (10EA/BX)

## (undated) DEVICE — SUTURE 0 VICRYL PLUS UR-6 - 27 INCH (36/BX)

## (undated) DEVICE — GLOVE BIOGEL PI ORTHO SZ 7 PF LF (40PR/BX)

## (undated) DEVICE — CANISTER SUCTION 3000ML MECHANICAL FILTER AUTO SHUTOFF MEDI-VAC NONSTERILE LF DISP  (40EA/CA)

## (undated) DEVICE — KIT  I.V. START (100EA/CA)

## (undated) DEVICE — TUBING CLEARLINK DUO-VENT - C-FLO (48EA/CA)

## (undated) DEVICE — MASK ANESTHESIA ADULT  - (100/CA)

## (undated) DEVICE — DRAPE UNDER BUTTOCKS FLUID - (20/CA)

## (undated) DEVICE — LACTATED RINGERS INJ 1000 ML - (14EA/CA 60CA/PF)

## (undated) DEVICE — SUTURE GENERAL

## (undated) DEVICE — CATHETER IV 20 GA X 1-1/4 ---SURG.& SDS ONLY--- (50EA/BX)

## (undated) DEVICE — SET LEADWIRE 5 LEAD BEDSIDE DISPOSABLE ECG (1SET OF 5/EA)

## (undated) DEVICE — ELECTRODE 850 FOAM ADHESIVE - HYDROGEL RADIOTRNSPRNT (50/PK)